# Patient Record
Sex: FEMALE | Race: WHITE | Employment: UNEMPLOYED | URBAN - METROPOLITAN AREA
[De-identification: names, ages, dates, MRNs, and addresses within clinical notes are randomized per-mention and may not be internally consistent; named-entity substitution may affect disease eponyms.]

---

## 2019-01-28 ENCOUNTER — OFFICE VISIT (OUTPATIENT)
Dept: CARDIOLOGY CLINIC | Age: 64
End: 2019-01-28
Payer: COMMERCIAL

## 2019-01-28 ENCOUNTER — TELEPHONE (OUTPATIENT)
Dept: CARDIOLOGY CLINIC | Age: 64
End: 2019-01-28

## 2019-01-28 VITALS
WEIGHT: 153.4 LBS | SYSTOLIC BLOOD PRESSURE: 122 MMHG | HEIGHT: 67 IN | HEART RATE: 84 BPM | DIASTOLIC BLOOD PRESSURE: 70 MMHG | BODY MASS INDEX: 24.08 KG/M2 | OXYGEN SATURATION: 96 %

## 2019-01-28 DIAGNOSIS — R07.89 OTHER CHEST PAIN: ICD-10-CM

## 2019-01-28 DIAGNOSIS — R94.31 ABNORMAL EKG: ICD-10-CM

## 2019-01-28 PROCEDURE — 99204 OFFICE O/P NEW MOD 45 MIN: CPT | Performed by: INTERNAL MEDICINE

## 2019-01-28 RX ORDER — ATORVASTATIN CALCIUM 10 MG/1
10 TABLET, FILM COATED ORAL DAILY
COMMUNITY
End: 2019-01-30 | Stop reason: ALTCHOICE

## 2019-01-30 RX ORDER — LOSARTAN POTASSIUM AND HYDROCHLOROTHIAZIDE 12.5; 1 MG/1; MG/1
1 TABLET ORAL DAILY
Qty: 90 TABLET | Refills: 1 | COMMUNITY
End: 2022-07-29 | Stop reason: DRUGHIGH

## 2019-01-30 RX ORDER — ESCITALOPRAM OXALATE 20 MG/1
20 TABLET ORAL DAILY
Qty: 30 TABLET | Refills: 3 | COMMUNITY
End: 2019-02-11

## 2019-01-30 RX ORDER — ATORVASTATIN CALCIUM 20 MG/1
20 TABLET, FILM COATED ORAL DAILY
Qty: 90 TABLET | Refills: 1 | COMMUNITY
End: 2020-07-21 | Stop reason: SDUPTHER

## 2019-02-11 ENCOUNTER — HOSPITAL ENCOUNTER (OUTPATIENT)
Dept: NON INVASIVE DIAGNOSTICS | Age: 64
Discharge: HOME | End: 2019-02-11
Payer: COMMERCIAL

## 2019-02-11 ENCOUNTER — HOSPITAL ENCOUNTER (OUTPATIENT)
Dept: NUCLEAR MEDICINE | Age: 64
Discharge: HOME | End: 2019-02-11
Payer: COMMERCIAL

## 2019-02-11 DIAGNOSIS — R94.31 ABNORMAL EKG: ICD-10-CM

## 2019-02-11 DIAGNOSIS — R07.89 OTHER CHEST PAIN: ICD-10-CM

## 2019-02-11 LAB
LV EF: 58 %
LV EF: 60 %
LVEF MODALITY: NORMAL
LVEF MODALITY: NORMAL

## 2019-02-11 PROCEDURE — 93306 TTE W/DOPPLER COMPLETE: CPT

## 2019-02-11 PROCEDURE — 3430000000 HC RX DIAGNOSTIC RADIOPHARMACEUTICAL: Performed by: INTERNAL MEDICINE

## 2019-02-11 PROCEDURE — 93017 CV STRESS TEST TRACING ONLY: CPT

## 2019-02-11 PROCEDURE — 78452 HT MUSCLE IMAGE SPECT MULT: CPT

## 2019-02-11 PROCEDURE — A9502 TC99M TETROFOSMIN: HCPCS | Performed by: INTERNAL MEDICINE

## 2019-02-11 RX ADMIN — TETROFOSMIN 32.1 MILLICURIE: 0.23 INJECTION, POWDER, LYOPHILIZED, FOR SOLUTION INTRAVENOUS at 08:30

## 2019-02-11 RX ADMIN — TETROFOSMIN 11.4 MILLICURIE: 0.23 INJECTION, POWDER, LYOPHILIZED, FOR SOLUTION INTRAVENOUS at 07:30

## 2019-02-12 ENCOUNTER — TELEPHONE (OUTPATIENT)
Dept: CARDIOLOGY CLINIC | Age: 64
End: 2019-02-12

## 2019-02-12 RX ORDER — NITROGLYCERIN 0.4 MG/1
TABLET SUBLINGUAL
Qty: 25 TABLET | Refills: 3 | Status: SHIPPED | OUTPATIENT
Start: 2019-02-12

## 2019-02-15 ENCOUNTER — TELEPHONE (OUTPATIENT)
Dept: CARDIOLOGY CLINIC | Age: 64
End: 2019-02-15

## 2019-03-04 ENCOUNTER — TELEPHONE (OUTPATIENT)
Dept: CARDIOLOGY CLINIC | Age: 64
End: 2019-03-04

## 2019-03-21 ENCOUNTER — TELEPHONE (OUTPATIENT)
Dept: CARDIOLOGY CLINIC | Age: 64
End: 2019-03-21

## 2019-03-25 ENCOUNTER — HOSPITAL ENCOUNTER (OUTPATIENT)
Dept: CARDIAC CATH/INVASIVE PROCEDURES | Age: 64
Setting detail: OUTPATIENT SURGERY
Discharge: HOME | End: 2019-03-25
Attending: INTERNAL MEDICINE | Admitting: INTERNAL MEDICINE
Payer: COMMERCIAL

## 2019-03-25 VITALS — BODY MASS INDEX: 24.01 KG/M2 | WEIGHT: 153 LBS | HEIGHT: 67 IN

## 2019-03-25 LAB
ANION GAP SERPL CALCULATED.3IONS-SCNC: 12 MMOL/L (ref 3–16)
BUN BLDV-MCNC: 18 MG/DL (ref 7–20)
CALCIUM SERPL-MCNC: 9.4 MG/DL (ref 8.3–10.6)
CHLORIDE BLD-SCNC: 99 MMOL/L (ref 99–110)
CO2: 29 MMOL/L (ref 21–32)
CREAT SERPL-MCNC: 0.8 MG/DL (ref 0.6–1.2)
EKG ATRIAL RATE: 79 BPM
EKG ATRIAL RATE: 90 BPM
EKG DIAGNOSIS: NORMAL
EKG DIAGNOSIS: NORMAL
EKG P AXIS: 65 DEGREES
EKG P AXIS: 69 DEGREES
EKG P-R INTERVAL: 164 MS
EKG P-R INTERVAL: 164 MS
EKG Q-T INTERVAL: 412 MS
EKG Q-T INTERVAL: 418 MS
EKG QRS DURATION: 84 MS
EKG QRS DURATION: 86 MS
EKG QTC CALCULATION (BAZETT): 479 MS
EKG QTC CALCULATION (BAZETT): 504 MS
EKG R AXIS: 53 DEGREES
EKG R AXIS: 60 DEGREES
EKG T AXIS: 23 DEGREES
EKG T AXIS: 31 DEGREES
EKG VENTRICULAR RATE: 79 BPM
EKG VENTRICULAR RATE: 90 BPM
GFR AFRICAN AMERICAN: >60
GFR NON-AFRICAN AMERICAN: >60
GLUCOSE BLD-MCNC: 103 MG/DL (ref 70–99)
HCT VFR BLD CALC: 39.2 % (ref 36–48)
HEMOGLOBIN: 13.6 G/DL (ref 12–16)
INR BLD: 1.04 (ref 0.86–1.14)
LV EF: 70 %
LVEF MODALITY: NORMAL
MCH RBC QN AUTO: 31 PG (ref 26–34)
MCHC RBC AUTO-ENTMCNC: 34.7 G/DL (ref 31–36)
MCV RBC AUTO: 89.3 FL (ref 80–100)
PDW BLD-RTO: 13.2 % (ref 12.4–15.4)
PLATELET # BLD: 189 K/UL (ref 135–450)
PMV BLD AUTO: 7.8 FL (ref 5–10.5)
POTASSIUM SERPL-SCNC: 3.6 MMOL/L (ref 3.5–5.1)
PROTHROMBIN TIME: 11.9 SEC (ref 9.8–13)
RBC # BLD: 4.39 M/UL (ref 4–5.2)
SODIUM BLD-SCNC: 140 MMOL/L (ref 136–145)
WBC # BLD: 5.8 K/UL (ref 4–11)

## 2019-03-25 PROCEDURE — 85027 COMPLETE CBC AUTOMATED: CPT

## 2019-03-25 PROCEDURE — 93005 ELECTROCARDIOGRAM TRACING: CPT | Performed by: INTERNAL MEDICINE

## 2019-03-25 PROCEDURE — 85610 PROTHROMBIN TIME: CPT

## 2019-03-25 PROCEDURE — 93452 LEFT HRT CATH W/VENTRCLGRPHY: CPT | Performed by: INTERNAL MEDICINE

## 2019-03-25 PROCEDURE — 99152 MOD SED SAME PHYS/QHP 5/>YRS: CPT

## 2019-03-25 PROCEDURE — 2500000003 HC RX 250 WO HCPCS

## 2019-03-25 PROCEDURE — 85347 COAGULATION TIME ACTIVATED: CPT

## 2019-03-25 PROCEDURE — C1887 CATHETER, GUIDING: HCPCS

## 2019-03-25 PROCEDURE — 80048 BASIC METABOLIC PNL TOTAL CA: CPT

## 2019-03-25 PROCEDURE — 2580000003 HC RX 258

## 2019-03-25 PROCEDURE — C1894 INTRO/SHEATH, NON-LASER: HCPCS

## 2019-03-25 PROCEDURE — 6370000000 HC RX 637 (ALT 250 FOR IP)

## 2019-03-25 PROCEDURE — 6360000002 HC RX W HCPCS

## 2019-03-25 PROCEDURE — C1769 GUIDE WIRE: HCPCS

## 2019-03-25 PROCEDURE — 6370000000 HC RX 637 (ALT 250 FOR IP): Performed by: INTERNAL MEDICINE

## 2019-03-25 PROCEDURE — 2709999900 HC NON-CHARGEABLE SUPPLY

## 2019-03-25 PROCEDURE — 93458 L HRT ARTERY/VENTRICLE ANGIO: CPT

## 2019-03-25 PROCEDURE — 99153 MOD SED SAME PHYS/QHP EA: CPT

## 2019-03-25 RX ORDER — MIDAZOLAM HYDROCHLORIDE 5 MG/ML
INJECTION INTRAMUSCULAR; INTRAVENOUS
Status: COMPLETED | OUTPATIENT
Start: 2019-03-25 | End: 2019-03-25

## 2019-03-25 RX ORDER — CLOPIDOGREL 300 MG/1
TABLET, FILM COATED ORAL
Status: COMPLETED | OUTPATIENT
Start: 2019-03-25 | End: 2019-03-25

## 2019-03-25 RX ORDER — SODIUM CHLORIDE 0.9 % (FLUSH) 0.9 %
10 SYRINGE (ML) INJECTION EVERY 12 HOURS SCHEDULED
Status: DISCONTINUED | OUTPATIENT
Start: 2019-03-25 | End: 2019-03-25 | Stop reason: HOSPADM

## 2019-03-25 RX ORDER — HEPARIN SODIUM 1000 [USP'U]/ML
INJECTION, SOLUTION INTRAVENOUS; SUBCUTANEOUS
Status: COMPLETED | OUTPATIENT
Start: 2019-03-25 | End: 2019-03-25

## 2019-03-25 RX ORDER — SODIUM CHLORIDE 0.9 % (FLUSH) 0.9 %
10 SYRINGE (ML) INJECTION PRN
Status: DISCONTINUED | OUTPATIENT
Start: 2019-03-25 | End: 2019-03-25 | Stop reason: HOSPADM

## 2019-03-25 RX ORDER — ASPIRIN 81 MG/1
243 TABLET, CHEWABLE ORAL ONCE
Status: DISCONTINUED | OUTPATIENT
Start: 2019-03-25 | End: 2019-03-25

## 2019-03-25 RX ORDER — SODIUM CHLORIDE 9 MG/ML
1000 INJECTION, SOLUTION INTRAVENOUS CONTINUOUS
Status: DISCONTINUED | OUTPATIENT
Start: 2019-03-25 | End: 2019-03-25 | Stop reason: HOSPADM

## 2019-03-25 RX ORDER — ASPIRIN 81 MG/1
81 TABLET, CHEWABLE ORAL ONCE
Status: COMPLETED | OUTPATIENT
Start: 2019-03-25 | End: 2019-03-25

## 2019-03-25 RX ORDER — CLOPIDOGREL BISULFATE 75 MG/1
75 TABLET ORAL DAILY
Qty: 30 TABLET | Refills: 0 | Status: SHIPPED | OUTPATIENT
Start: 2019-03-25 | End: 2019-04-17 | Stop reason: SINTOL

## 2019-03-25 RX ORDER — ATENOLOL 25 MG/1
25 TABLET ORAL DAILY
Qty: 30 TABLET | Refills: 3 | Status: SHIPPED | OUTPATIENT
Start: 2019-03-25 | End: 2019-05-23 | Stop reason: SDUPTHER

## 2019-03-25 RX ORDER — FENTANYL CITRATE 50 UG/ML
INJECTION, SOLUTION INTRAMUSCULAR; INTRAVENOUS
Status: COMPLETED | OUTPATIENT
Start: 2019-03-25 | End: 2019-03-25

## 2019-03-25 RX ORDER — ACETAMINOPHEN 325 MG/1
650 TABLET ORAL EVERY 4 HOURS PRN
Status: DISCONTINUED | OUTPATIENT
Start: 2019-03-25 | End: 2019-03-25 | Stop reason: HOSPADM

## 2019-03-25 RX ADMIN — ASPIRIN 81 MG: 81 TABLET, CHEWABLE ORAL at 07:35

## 2019-03-25 RX ADMIN — HEPARIN SODIUM 5000 UNITS: 1000 INJECTION, SOLUTION INTRAVENOUS; SUBCUTANEOUS at 09:42

## 2019-03-25 RX ADMIN — SODIUM CHLORIDE 1000 ML: 9 INJECTION, SOLUTION INTRAVENOUS at 07:35

## 2019-03-25 RX ADMIN — FENTANYL CITRATE 25 MCG: 50 INJECTION, SOLUTION INTRAMUSCULAR; INTRAVENOUS at 09:30

## 2019-03-25 RX ADMIN — CLOPIDOGREL 600 MG: 300 TABLET, FILM COATED ORAL at 09:48

## 2019-03-25 RX ADMIN — MIDAZOLAM HYDROCHLORIDE 2 MG: 5 INJECTION INTRAMUSCULAR; INTRAVENOUS at 09:31

## 2019-03-26 LAB — POC ACT LR: 381 SEC

## 2019-04-03 ENCOUNTER — TELEPHONE (OUTPATIENT)
Dept: CARDIOLOGY CLINIC | Age: 64
End: 2019-04-03

## 2019-04-03 NOTE — TELEPHONE ENCOUNTER
3/25/19 Dr. Stanley Wilkinson, mainly in LCx and RCA, will treat medially  Patient was intolerant of metoprolol, will add atenolol in its place and add plavix  Ulcerated plaque in RCA, treat with dapt, aspirin and plavix

## 2019-04-03 NOTE — TELEPHONE ENCOUNTER
Lets have her stop plavix, and lets hold off on alternate med until she feels better. Let her know it is not an absolute must that she take plavix but she should take aspirin. Lets put in a med intolerance note in epic allergy section for plavix causing nausea for her. Thanks.

## 2019-04-03 NOTE — TELEPHONE ENCOUNTER
Spoke to pt. She is going to stop the Plavix and continue the [place]. Pt will let us know how she is feeling. I will update allergy in pt's chart. Pt v/u.

## 2019-04-03 NOTE — TELEPHONE ENCOUNTER
Pt called and stated that she is having a hard time taking plavix. She stated that it is resally bothering her stomach causing nausea. Pt would like to know If there is anything else she can take.  Please advise

## 2019-04-17 ENCOUNTER — OFFICE VISIT (OUTPATIENT)
Dept: CARDIOLOGY CLINIC | Age: 64
End: 2019-04-17
Payer: COMMERCIAL

## 2019-04-17 VITALS
DIASTOLIC BLOOD PRESSURE: 68 MMHG | BODY MASS INDEX: 24.01 KG/M2 | OXYGEN SATURATION: 95 % | HEART RATE: 72 BPM | WEIGHT: 153 LBS | HEIGHT: 67 IN | SYSTOLIC BLOOD PRESSURE: 110 MMHG

## 2019-04-17 DIAGNOSIS — I10 ESSENTIAL HYPERTENSION: ICD-10-CM

## 2019-04-17 DIAGNOSIS — I25.10 CORONARY ARTERY DISEASE INVOLVING NATIVE CORONARY ARTERY OF NATIVE HEART WITHOUT ANGINA PECTORIS: Primary | ICD-10-CM

## 2019-04-17 DIAGNOSIS — E78.2 MIXED HYPERLIPIDEMIA: ICD-10-CM

## 2019-04-17 PROCEDURE — 99214 OFFICE O/P EST MOD 30 MIN: CPT | Performed by: NURSE PRACTITIONER

## 2019-04-17 RX ORDER — ESCITALOPRAM OXALATE 20 MG/1
20 TABLET ORAL DAILY
COMMUNITY

## 2019-04-17 ASSESSMENT — ENCOUNTER SYMPTOMS
RESPIRATORY NEGATIVE: 1
ABDOMINAL PAIN: 1
NAUSEA: 1

## 2019-04-17 NOTE — PROGRESS NOTES
[address]   Cardiology Note              Date:  April 17, 2019  Patientname: Esmer Pisano  YOB: 1955    Primary Care physician: Olman Bear MD    HISTORY OF PRESENT ILLNESS: Esmer Pisano is a 66 y.o. female was referred to cardiology for an abnormal EKG (ST depression). Stress test 2/11/2019 was abnormal due to ST depression on EKG, normal perfusion. Echo showed EF 55-60%. Coronary angiography on 3/25/19 showed moderate CAD, medical management. Today she presents for hospital follow up for CAD post angiogram. Overall she feels well. Denies chest pain, shortness of breath, palpitations and dizziness. She has had GI upset and heartburn with pills. She has stopped plavix due to this. She takes her pills after she has had dinner at night. No bleeding problems. She is active at home and has no discomfort during exertion. Past Medical History:   has a past medical history of Chest pressure, Depression, High cholesterol, Hypertension, and RA (rheumatoid arthritis) ([facility] [address] Past Surgical History:   has a past surgical history that includes Tubal ligation; Colonoscopy; other surgical history (10/21/2015); and Cardiac catheterization (03/25/2019). Home Medications:    Prior to Admission medications    Medication Sig Start Date End Date Taking? Authorizing Provider   clopidogrel (PLAVIX) 75 MG tablet Take 1 tablet by mouth daily 3/25/19   Kendall Romero MD   atenolol (TENORMIN) 25 MG tablet Take 1 tablet by mouth daily 3/25/19   Kendall Romero MD   nitroGLYCERIN (NITROSTAT) 0.4 MG SL tablet up to max of 3 total doses.  If no relief after 1 dose, call 911. 2/12/19   Kendall Romero MD   losartan-hydrochlorothiazide [facility]) 50-12.5 MG per tablet Take 1 tablet by mouth daily    Kendall Romero MD   atorvastatin (LIPITOR) 20 MG tablet Take 1 tablet by mouth daily    Kendall Romero MD   ENBREL SURECLICK 50 MG/ML SOAJ  47/9/44   Historical Provider, MD   aspirin 81 MG chewable tablet Take 81 steady ekg changes (other than w/ contrast), and no cp during case. Cad/ashd, mainly in LCx and RCA, will treat medially  Patient was intolerant of metoprolol, will add atenolol in its place and add plavix  Ulcerated plaque in RCA, treat with dapt, aspirin and plavix     Cardiology Labs Reviewed:   CBC: No results for input(s): WBC, HGB, HCT, PLT in the last 72 hours. BMP:No results for input(s): NA, K, CO2, BUN, CREATININE, LABGLOM, GLUCOSE in the last 72 hours. PT/INR: No results for input(s): PROTIME, INR in the last 72 hours. APTT:No results for input(s): APTT in the last 72 hours. FASTING LIPID PANEL:No results found for: HDL, LDLDIRECT, LDLCALC, TRIG  LIVER PROFILE:No results for input(s): AST, ALT, ALB in the last 72 hours. BNP: No results found for: PROBNP    Reviewed all labs and imaging today    Assessment:   CAD: stable, moderate in LCx/RCA on angiogram, medical management 3/25/2019  HTN: controlled  HLD: uncontrolled,  1/2019, statin increased  GI upset  Tobacco abuse: counseled    Plan:   1. Repeat lipids/LFTs in 3-6 months; if LDL not <70, increase statin  2. Continue aspirin, atenolol, lipitor, losartan-hctz (intolerant to metoprolol and plavix due to GI symptoms)   - Try to adjust the times you take pills to minimize GI effects  3. Continue diet and exercise  4. Follow up with PCP regarding GI symptoms  5.  Follow up in 6 months    DRU Contreras-CNP  [facility]  [phone]

## 2019-04-17 NOTE — PATIENT INSTRUCTIONS
ventricular hypertrophy. Normal diastolic function with normal filling pressure. Aortic valve appears mildly sclerotic but opens adequately. Inadequate tricuspid regurgitation to estimate systolic pulmonary artery   pressure. Coronary angiogram 3/25/2019:  Class 3 angina  Low to intermediate risk abnl stress test  Left heart catheterization  LVgram  Coronary angiogam  Coronary cath  PROCEDURE DESCRIPTION   Risks/benefits/alternatives/outcomes were discussed with patient and/or family and informed consent was obtained. Using the [facility] test, the patient's right radial artery was found to be acceptable for cannulation. Patient was prepped draped in the usual sterile fashion. Local anaesthetic was applied over puncture site. Using a back wall technique, a 6 [place]ien Terumo sheath was inserted into right radial artery. Verapamil, nitroglycerin were administered through the sheath. Heparin was administered. Diagnostic 5fr pigtail, TIG catheters were used for diagnostic angiograms. At the conclusion of the procedure, a TR band was placed over the puncture site and hemostasis was obtained. There were no immediate complications. I supervised sedation with versed 2mg/fentanyl 25mcg during the procedure. 70cc contrast was utilized. <20cc EBL. LVEDP 13   GRADIENT ACROSS AORTIC VALVE No gradient   LV FUNCTION EF 65-75%   WALL MOTION Hyperdynamic   MITRAL REGURGITATION Mild     LM <10% stenosis.       LAD Prox 30% stenosis. Mid 30-40% stenosis. Distal 10% stenosis. LAD wraps around apex. D1 is large vessel with ostial 30-40% stenosis.       LCX Small vessel. OM1 is small vessel. OM2 is largest OM and has prox-mid 75% stenosis.             RCA Dominant. Prox vessel ulcerated plaque noted with prox mid 30-40% stenosis. Mid-distal 50% stenosis. Ulcerated plaque was followed on serial angio in cath lab due to concern may have been raised by catheter.   There was no limitation in flow, staining, steady ekg changes (other than w/ contrast), and no cp during case. Cad/ashd, mainly in LCx and RCA, will treat medially  Patient was intolerant of metoprolol, will add atenolol in its place and add plavix  Ulcerated plaque in RCA, treat with dapt, aspirin and plavix     Cardiology Labs Reviewed:   CBC: No results for input(s): WBC, HGB, HCT, PLT in the last 72 hours. BMP:No results for input(s): NA, K, CO2, BUN, CREATININE, LABGLOM, GLUCOSE in the last 72 hours. PT/INR: No results for input(s): PROTIME, INR in the last 72 hours. APTT:No results for input(s): APTT in the last 72 hours. FASTING LIPID PANEL:No results found for: HDL, LDLDIRECT, LDLCALC, TRIG  LIVER PROFILE:No results for input(s): AST, ALT, ALB in the last 72 hours. BNP: No results found for: PROBNP    Reviewed all labs and imaging today    Assessment:   CAD: stable, moderate in LCx/RCA on angiogram, medical management 3/25/2019  HTN: controlled  HLD: uncontrolled,  1/2019, statin increased  GI upset  Tobacco abuse: counseled    Plan:   1. Repeat lipids/LFTs in 3-6 months; if LDL not <70, increase statin  2. Continue aspirin, atenolol, lipitor, losartan-hctz (intolerant to metoprolol and plavix due to GI symptoms)   - Try to adjust the times you take pills to minimize GI effects  3. Continue diet and exercise  4. Follow up with PCP regarding GI symptoms  5.  Follow up in 6 months    DRU Rosales-CNP  [facility]  [phone]

## 2019-05-23 ENCOUNTER — TELEPHONE (OUTPATIENT)
Dept: CARDIOLOGY CLINIC | Age: 64
End: 2019-05-23

## 2019-05-23 RX ORDER — ATENOLOL 25 MG/1
25 TABLET ORAL DAILY
Qty: 90 TABLET | Refills: 3 | Status: SHIPPED | OUTPATIENT
Start: 2019-05-23 | End: 2020-05-18

## 2020-05-17 ENCOUNTER — TELEPHONE (OUTPATIENT)
Dept: CARDIOLOGY CLINIC | Age: 65
End: 2020-05-17

## 2020-05-18 RX ORDER — ATENOLOL 25 MG/1
TABLET ORAL
Qty: 90 TABLET | Refills: 0 | Status: SHIPPED | OUTPATIENT
Start: 2020-05-18 | End: 2020-08-17

## 2020-06-22 ENCOUNTER — OFFICE VISIT (OUTPATIENT)
Dept: CARDIOLOGY CLINIC | Age: 65
End: 2020-06-22
Payer: COMMERCIAL

## 2020-06-22 VITALS
BODY MASS INDEX: 24.03 KG/M2 | DIASTOLIC BLOOD PRESSURE: 60 MMHG | SYSTOLIC BLOOD PRESSURE: 108 MMHG | HEIGHT: 66 IN | WEIGHT: 149.5 LBS

## 2020-06-22 PROCEDURE — 99213 OFFICE O/P EST LOW 20 MIN: CPT | Performed by: NURSE PRACTITIONER

## 2020-06-22 ASSESSMENT — ENCOUNTER SYMPTOMS: RESPIRATORY NEGATIVE: 1

## 2020-06-22 NOTE — PROGRESS NOTES
artery   pressure. Cardiology Labs Reviewed:   CBC: No results for input(s): WBC, HGB, HCT, PLT in the last 72 hours. BMP:No results for input(s): NA, K, CO2, BUN, CREATININE, LABGLOM, GLUCOSE in the last 72 hours. PT/INR: No results for input(s): PROTIME, INR in the last 72 hours. APTT:No results for input(s): APTT in the last 72 hours. FASTING LIPID PANEL:No results found for: HDL, LDLDIRECT, LDLCALC, TRIG  LIVER PROFILE:No results for input(s): AST, ALT, ALB in the last 72 hours. BNP: No results found for: PROBNP    Reviewed all labs and imaging today    Assessment:   Dizziness: chronic, etiology unclear  CAD: stable, no angina; moderate in LCx/RCA on angiogram 3/25/2019  HTN: controlled  HLD: uncontrolled,  in 1/2019, continue statin and recheck  Tobacco abuse: counseled    Plan:   1. Update yearly labs- CBC, BMP, LFTs, lipids  2. Check BP at home and call the office if outside goal range  3. Continue aspirin, atenolol, lipitor, losartan-hctz (intolerant to metoprolol and plavix due to GI symptoms)  4. Stay active along with a healthy diet  5.  Follow up in 1 year with Dr. Olga James, APRN-CNP  [facility]  [phone]

## 2020-06-22 NOTE — LETTER
citalopram (CELEXA) 20 MG tablet Take 20 mg by mouth daily. Historical Provider, MD     Allergies:  Plavix [clopidogrel]    Social History:   reports that she has been smoking cigarettes. She has a 25.00 pack-year smoking history. She has never used smokeless tobacco. She reports current alcohol use. She reports that she does not use drugs. Family History: family history includes Heart Attack in her maternal uncle; Stroke in her maternal grandmother. Review of Systems   Review of Systems   Constitutional: Negative. Respiratory: Negative. Cardiovascular: Negative. Neurological: Positive for dizziness. OBJECTIVE:    Vital signs:    /60   Ht 5' 6\" (1.676 m)   Wt 149 lb 8 oz (67.8 kg)   BMI 24.13 kg/m²       Physical Exam:  Constitutional:  Comfortable and alert, NAD, appears stated age  Eyes: PERRL, sclera nonicteric  Neck:  Supple, no masses, no thyroidmegaly, no JVD  Skin:  Warm and dry; no rash or lesions  Heart: Regular, normal apex, S1 and S2 normal, no M/G/R  Lungs:  Normal respiratory effort; clear; no wheezing/rhonchi/rales  Abdomen: soft, non tender, + bowel sounds  Extremities:  No edema or cyanosis; no clubbing  Neuro: alert and oriented, moves legs and arms equally, normal mood and affect    Data Reviewed:      Coronary angiogram 3/25/2019:  Class 3 angina  Low to intermediate risk abnl stress test  Left heart catheterization  LVgram  Coronary angiogam  Coronary cath  PROCEDURE DESCRIPTION   Risks/benefits/alternatives/outcomes were discussed with patient and/or family and informed consent was obtained. Using the [facility] test, the patient's right radial artery was found to be acceptable for cannulation. Patient was prepped draped in the usual sterile fashion. Local anaesthetic was applied over puncture site. Using a back wall technique, a 6 [place] Terumo sheath was inserted into right radial artery. Verapamil, nitroglycerin were administered through the sheath.   Heparin

## 2020-07-21 LAB
A/G RATIO: NORMAL
ALBUMIN SERPL-MCNC: 4 G/DL
ALP BLD-CCNC: 84 U/L
ALT SERPL-CCNC: 13 U/L
AST SERPL-CCNC: 20 U/L
BILIRUB SERPL-MCNC: 0.6 MG/DL (ref 0.1–1.4)
BILIRUBIN DIRECT: 0.12 MG/DL
BILIRUBIN, INDIRECT: NORMAL
BUN BLDV-MCNC: 12 MG/DL
CALCIUM SERPL-MCNC: 9.2 MG/DL
CHLORIDE BLD-SCNC: 99 MMOL/L
CHOLESTEROL, TOTAL: 183 MG/DL
CHOLESTEROL/HDL RATIO: NORMAL
CO2: 25 MMOL/L
CREAT SERPL-MCNC: 0.93 MG/DL
GFR CALCULATED: 66
GLOBULIN: NORMAL
GLUCOSE BLD-MCNC: 93 MG/DL
HCT VFR BLD CALC: 40.8 % (ref 36–46)
HDLC SERPL-MCNC: 40 MG/DL (ref 35–70)
HEMOGLOBIN: 13.9 G/DL (ref 12–16)
LDL CHOLESTEROL CALCULATED: 106 MG/DL (ref 0–160)
PLATELET # BLD: 200 K/ΜL
POTASSIUM SERPL-SCNC: 4 MMOL/L
PROTEIN TOTAL: 7.4 G/DL
SODIUM BLD-SCNC: 138 MMOL/L
TRIGL SERPL-MCNC: 183 MG/DL
VLDLC SERPL CALC-MCNC: 106 MG/DL
WBC # BLD: 6.1 10^3/ML

## 2020-07-21 RX ORDER — ATORVASTATIN CALCIUM 40 MG/1
40 TABLET, FILM COATED ORAL DAILY
Qty: 90 TABLET | Refills: 3 | Status: CANCELLED | OUTPATIENT
Start: 2020-07-21

## 2020-07-21 NOTE — TELEPHONE ENCOUNTER
----- Message from DRU Carter - CNP sent at 7/21/2020 11:31 AM EDT -----  Please let her know that blood work mostly stable however LDL is not at goal. Recommend increasing lipitor to 40 mg daily and recheck lipids/LFTs in 2 months. Thanks!

## 2020-07-22 RX ORDER — ATORVASTATIN CALCIUM 40 MG/1
40 TABLET, FILM COATED ORAL DAILY
Qty: 90 TABLET | Refills: 3 | Status: SHIPPED | OUTPATIENT
Start: 2020-07-22 | End: 2021-06-01

## 2020-08-17 RX ORDER — ATENOLOL 25 MG/1
TABLET ORAL
Qty: 90 TABLET | Refills: 3 | Status: SHIPPED | OUTPATIENT
Start: 2020-08-17 | End: 2021-08-11

## 2021-06-01 RX ORDER — ATORVASTATIN CALCIUM 40 MG/1
TABLET, FILM COATED ORAL
Qty: 90 TABLET | Refills: 0 | Status: SHIPPED | OUTPATIENT
Start: 2021-06-01 | End: 2021-08-30

## 2021-08-11 RX ORDER — ATENOLOL 25 MG/1
TABLET ORAL
Qty: 90 TABLET | Refills: 3 | Status: SHIPPED | OUTPATIENT
Start: 2021-08-11 | End: 2022-07-29 | Stop reason: SINTOL

## 2021-08-11 NOTE — TELEPHONE ENCOUNTER
Pt/pharmacy requesting atenolol 25 mg tab. [facility]. Last OV 6/22/2021    Per last OV note: Continue aspirin, atenolol, lipitor, losartan-hctz (intolerant to metoprolol and plavix due to GI symptoms)  Follow up in 1 year with Dr. Philip Mujica    No upcoming [address] scheduled at this time.

## 2021-08-31 RX ORDER — ATORVASTATIN CALCIUM 40 MG/1
TABLET, FILM COATED ORAL
Qty: 90 TABLET | Refills: 0 | Status: SHIPPED | OUTPATIENT
Start: 2021-08-31 | End: 2021-12-03

## 2021-10-02 PROBLEM — I25.10 CORONARY ARTERY DISEASE INVOLVING NATIVE CORONARY ARTERY OF NATIVE HEART WITHOUT ANGINA PECTORIS: Status: ACTIVE | Noted: 2021-10-02

## 2021-10-03 NOTE — PROGRESS NOTES
[facility]   CARDIAC EVALUATION NOTE  [phone]      PCP:  Alejandra Ramos MD    Reason for Consultation/Chief Complaint: follow up for moderate CAD    Subjective   History of Present Illness:  Cristela Louis is a 61 y.o. patient with a history of moderate CAD who presents referred by Dr. Ashley Osorio for abnormal EKG.  1/20/19 EKG showed SR, non specific ST depression, 73 bpm.  [facility] dr Shelley Ramirez in past.   She states she has had chest pressure x 6 months. Happens daily. She can be sitting or be exerting when it happens. Middle of chest, pressure. She states this is new over the last 6 months. Stress test 2/11/2019 was abnormal due to ST depression on EKG, normal perfusion. Echo 02/11/19 showed EF 55-60%. Coronary angiography on 3/25/19 showed moderate CAD, medical management. Today she reports she feels well overall. She has occasional mild CP but this is not bothersome. She denies palpitations, dizziness, SOB or syncope. Past Medical History:   has a past medical history of Chest pressure, Depression, High cholesterol, Hypertension, and RA (rheumatoid arthritis) ([address] Surgical History:   has a past surgical history that includes Tubal ligation; Colonoscopy; other surgical history (10/21/2015); Cardiac catheterization (03/25/2019); and Diagnostic Cardiac Cath Lab Procedure. Social History: retired payroll. reports that she has been smoking Cigarettes. She has been smoking about 1.00 pack per day. She does not have any smokeless tobacco history on file. She reports that she drinks alcohol. She reports that she does not use drugs. Family History:  Mom, dad with no heart disease.  Both passed of CA  No sibling with heart disease    Home Medications:  Were reviewed and are listed in nursing record and/or below  Prior to Admission medications    Medication Sig Start Date End Date Taking? Authorizing Provider   atorvastatin (LIPITOR) 40 MG tablet TAKE 1 TABLET DAILY 8/31/21  Yes DRU Person CNP   atenolol (TENORMIN) 25 MG tablet TAKE 1 TABLET DAILY 8/11/21  Yes DRU Preson CNP   escitalopram (LEXAPRO) 20 MG tablet Take 20 mg by mouth daily   Yes Historical Provider, MD   nitroGLYCERIN (NITROSTAT) 0.4 MG SL tablet up to max of 3 total doses. If no relief after 1 dose, call 911. 2/12/19  Yes Fozia Blood MD   losartan-hydrochlorothiazide [facility]) 100-12.5 MG per tablet Take 1 tablet by mouth daily    Yes Fozia Blood MD   ENBREL SURECLICK 50 MG/ML SOAJ  13/5/66  Yes Historical Provider, MD   aspirin 81 MG chewable tablet Take 81 mg by mouth daily    Yes Historical Provider, MD   citalopram (CELEXA) 20 MG tablet Take 20 mg by mouth daily. Yes Historical Provider, MD          Allergies:  Plavix [clopidogrel] and Methotrexate     Review of Systems:   A 14 point review of symptoms completed. Pertinent positives identified in the HPI, all other review of symptoms negative as below.       Objective   PHYSICAL EXAM:    Vitals:    10/06/21 1256   BP: 132/72   Pulse: 74   SpO2: 94%    Weight: 140 lb (63.5 kg)         General Appearance:  Alert, cooperative, no distress, appears stated age   Head:  Normocephalic, without obvious abnormality, atraumatic   Eyes:  PERRL, conjunctiva/corneas clear   Nose: Nares normal, no drainage or sinus tenderness   Throat: Lips, mucosa, and tongue normal   Neck: Supple, symmetrical, trachea midline, no adenopathy, thyroid: not enlarged, symmetric, no tenderness/mass/nodules, no carotid bruit or JVD   Lungs:   Clear to auscultation bilaterally, respirations unlabored   Chest Wall:  No deformity or tenderness   Heart:  Regular rate and rhythm, S1, S2 normal, no murmur, rub or gallop   Abdomen:   Soft, non-tender, bowel sounds active all four quadrants,  no masses, no organomegaly   Extremities: Extremities normal, atraumatic, no cyanosis or edema   Pulses: 2+ and symmetric   Skin: Skin color, texture, turgor normal, no rashes or lesions   Pysch: Normal mood and affect   Neurologic: Normal gross motor and sensory exam.         Labs   CBC:   Lab Results   Component Value Date    WBC 6.1 2020    RBC 4.39 2019    HGB 13.9 2020    HCT 40.8 2020    MCV 89.3 2019    RDW 13.2 2019     2020     CMP:  Lab Results   Component Value Date     2020    K 4.0 2020    CL 99 2020    CO2 25 2020    BUN 12 2020    CREATININE 0.93 2020    GFRAA >60 2019    AGRATIO 1.2 2015    LABGLOM 66 2020    LABGLOM >60 2019    GLUCOSE 93 2020    PROT 7.4 2020    CALCIUM 9.2 2020    BILITOT 0.6 2020    ALKPHOS 84 2020    AST 20 2020    ALT 13 2020         Cardiac Data     Last EK19 EKG showed SR, non specific ST depression, 73 bpm.     Echo: 19   Summary   LV systolic function is normal with EF estimated at 55-60 %. No regional wall motion abnormalities are noted. There is mild concentric left ventricular hypertrophy. Normal diastolic function with normal filling pressure. Aortic valve appears mildly sclerotic but opens adequately. Inadequate tricuspid regurgitation to estimate systolic pulmonary artery   pressure. Stress Test: 19  Fair to poor exercise capacity 4.6 METs  Abnormal ECG portion of stress test with 1 mm ST depression consistent with  ischemia. Nuclear images are normal with normal LVEF, normal wall motion, and no  reversible defects. Overall, this is a low to intermediate risk study due to abnormal ekgs. Cath: 19  FINDINGS            LVGRAM     LVEDP 13   GRADIENT ACROSS AORTIC VALVE No gradient   LV FUNCTION EF 65-75%   WALL MOTION Hyperdynamic   MITRAL REGURGITATION Mild         CORONARY ARTERIES     LM <10% stenosis. LAD Prox 30% stenosis.   Mid 30-40% stenosis. Distal 10% stenosis. LAD wraps around apex. D1 is large vessel with ostial 30-40% stenosis. LCX Small vessel. OM1 is small vessel. OM2 is largest OM and has prox-mid 75% stenosis. RCA Dominant. Prox vessel ulcerated plaque noted with prox mid 30-40% stenosis. Mid-distal 50% stenosis. Ulcerated plaque was followed on serial angio in cath lab due to concern may have been raised by catheter. There was no limitation in flow, staining, steady ekg changes (other than w/ contrast), and no cp during case. CONCLUSIONS:      Cad/ashd, mainly in LCx and RCA, will treat medially  Patient was intolerant of metoprolol, will add atenolol in its place and add plavix  Ulcerated plaque in RCA, treat with dapt, aspirin and plavix       Studies:     Assessment      1. Coronary artery disease involving native coronary artery of native heart without angina pectoris               Plan   1. Continue current medications, check your med list at home, call us back with any discrepancies   2. Liver and lipids before the holidays  3. Follow up in 1 year      Scribe's attestation: This note was scribed in the presence of Dr. Dianna Kelly by Felicia Reyes RN        Thank you for allowing us to participate in the care of Nirmal . Please call me with any questions [phone]. Dianna Kelly MD, [facility]   Interventional Cardiologist  [facility] [address]  [phone] [facility]  [phone] [facility]  10/6/2021 1:15 PM    I will address the patient's cardiac risk factors and adjusted pharmacologic treatment as needed. In addition, I have reinforced the need for patient directed risk factor modification. Tobacco use was discussed with the patient and educated on the negative effects and was asked not to use. All questions and concerns were addressed to the patient/family. Alternatives to my treatment were discussed.      I, Dr Dianna Kelly, personally performed the services described in this documentation, as scribed by the above signed scribe in my presence. It is both accurate and complete to my knowledge. I agree with the details independently gathered by the clinical support staff and the scribed note accurately describes my personal service to the patient.

## 2021-10-06 ENCOUNTER — OFFICE VISIT (OUTPATIENT)
Dept: CARDIOLOGY CLINIC | Age: 66
End: 2021-10-06
Payer: COMMERCIAL

## 2021-10-06 VITALS
HEIGHT: 66 IN | DIASTOLIC BLOOD PRESSURE: 72 MMHG | BODY MASS INDEX: 22.5 KG/M2 | WEIGHT: 140 LBS | OXYGEN SATURATION: 94 % | HEART RATE: 74 BPM | SYSTOLIC BLOOD PRESSURE: 132 MMHG

## 2021-10-06 DIAGNOSIS — I25.10 CORONARY ARTERY DISEASE INVOLVING NATIVE CORONARY ARTERY OF NATIVE HEART WITHOUT ANGINA PECTORIS: ICD-10-CM

## 2021-10-06 PROCEDURE — 93000 ELECTROCARDIOGRAM COMPLETE: CPT | Performed by: INTERNAL MEDICINE

## 2021-10-06 PROCEDURE — 99213 OFFICE O/P EST LOW 20 MIN: CPT | Performed by: INTERNAL MEDICINE

## 2021-10-06 NOTE — LETTER
[facility] [address]  Phone: [phone]  Fax: [address] Leslie Sifuentes MD    October 7, 2021     Aliyah Bacon MD  . [facility] [id number]    Patient: Demetri Parsons   MR Number: [id number]   YOB: 1955   Date of Visit: 10/6/2021       Dear Aliyah Bacon:    Thank you for referring Carin Waterman to me for evaluation/treatment. Below are the relevant portions of my assessment and plan of care. If you have questions, please do not hesitate to call me. I look forward to following Aliya Jackson along with you.     Sincerely,      Mook Amanda MD

## 2021-11-23 ENCOUNTER — HOSPITAL ENCOUNTER (OUTPATIENT)
Age: 66
Discharge: HOME | End: 2021-11-23
Payer: COMMERCIAL

## 2021-11-23 ENCOUNTER — HOSPITAL ENCOUNTER (OUTPATIENT)
Dept: GENERAL RADIOLOGY | Age: 66
Discharge: HOME | End: 2021-11-23
Payer: COMMERCIAL

## 2021-11-23 DIAGNOSIS — M54.6 CHRONIC MIDLINE THORACIC BACK PAIN: ICD-10-CM

## 2021-11-23 DIAGNOSIS — G89.29 CHRONIC MIDLINE THORACIC BACK PAIN: ICD-10-CM

## 2021-11-23 LAB
ALBUMIN SERPL-MCNC: 4 G/DL (ref 3.4–5)
ALP BLD-CCNC: 84 U/L (ref 40–129)
ALT SERPL-CCNC: 18 U/L (ref 10–40)
AST SERPL-CCNC: 22 U/L (ref 15–37)
BILIRUB SERPL-MCNC: 0.6 MG/DL (ref 0–1)
BILIRUBIN DIRECT: <0.2 MG/DL (ref 0–0.3)
BILIRUBIN, INDIRECT: NORMAL MG/DL (ref 0–1)
CHOLESTEROL, TOTAL: 137 MG/DL (ref 0–199)
HDLC SERPL-MCNC: 45 MG/DL (ref 40–60)
LDL CHOLESTEROL CALCULATED: 73 MG/DL
TOTAL PROTEIN: 7.4 G/DL (ref 6.4–8.2)
TRIGL SERPL-MCNC: 94 MG/DL (ref 0–150)
VLDLC SERPL CALC-MCNC: 19 MG/DL

## 2021-11-23 PROCEDURE — 80061 LIPID PANEL: CPT

## 2021-11-23 PROCEDURE — 72070 X-RAY EXAM THORAC SPINE 2VWS: CPT

## 2021-11-23 PROCEDURE — 36415 COLL VENOUS BLD VENIPUNCTURE: CPT

## 2021-11-23 PROCEDURE — 80076 HEPATIC FUNCTION PANEL: CPT

## 2021-12-02 ENCOUNTER — TELEPHONE (OUTPATIENT)
Dept: ORTHOPEDIC SURGERY | Age: 66
End: 2021-12-02

## 2021-12-02 ENCOUNTER — OFFICE VISIT (OUTPATIENT)
Dept: ORTHOPEDIC SURGERY | Age: 66
End: 2021-12-02
Payer: COMMERCIAL

## 2021-12-02 VITALS — WEIGHT: 140 LBS | BODY MASS INDEX: 22.5 KG/M2 | HEIGHT: 66 IN

## 2021-12-02 DIAGNOSIS — M47.24 THORACIC SPONDYLOSIS WITH RADICULOPATHY: Primary | ICD-10-CM

## 2021-12-02 PROCEDURE — 99203 OFFICE O/P NEW LOW 30 MIN: CPT | Performed by: PHYSICIAN ASSISTANT

## 2021-12-02 RX ORDER — GABAPENTIN 300 MG/1
300 CAPSULE ORAL NIGHTLY
Qty: 30 CAPSULE | Refills: 0 | Status: SHIPPED | OUTPATIENT
Start: 2021-12-02 | End: 2022-05-13

## 2021-12-02 NOTE — TELEPHONE ENCOUNTER
LM for patient that MRI is approved.   Faxed everything over to [facility]

## 2021-12-02 NOTE — PROGRESS NOTES
New Patient: LUMBAR SPINE    Referring Provider:  [facility] DRU Mills NP    CHIEF COMPLAINT:    Chief Complaint   Patient presents with    Back Pain     thoracic       HISTORY OF PRESENT ILLNESS:     Ms. Kalyn Babcock  is a pleasant 61 y.o. female here for consultation regarding her thoracic back pain. She states her pain began insidiously about 4 years ago. Her pain has steadily increased since then. She rates her back pain 9/10 and leg pain 0/10. She describes the pain as aching and stabbing at times. Her back pain is constant. Pain is worse with prolonged sitting or standing and improved some with change in positioning or lying down. Pain is most severe in her mid to lower thoracic spine. She notes intermittent neck and interscapular pain that began recently, but is less severe than her lower thoracic pain. She denies lower extremity radicular pain, numbness or weakness. She denies saddle numbness or bowel or bladder dysfunction. The pain occasionally disrupts her sleep. She sees Rheumatology.     Current/Past Treatment:   · Physical Therapy: Yes, without substantial improvement  · Chiropractic:  No   · Injection:  No   · Medications:  Ultram     Past Medical History:   Past Medical History:   Diagnosis Date    Chest pressure 03/2019    abn EKG    Depression     High cholesterol     Hypertension 2016    RA (rheumatoid arthritis) ([facility] [address]         Past Surgical History:     Past Surgical History:   Procedure Laterality Date    CARDIAC CATHETERIZATION  03/25/2019    Non Obs CAD, Medical management    COLONOSCOPY      DIAGNOSTIC CARDIAC CATH LAB PROCEDURE      OTHER SURGICAL HISTORY  10/21/2015    excision of skin lesion on scalp    TUBAL LIGATION         Current Medications:     Current Outpatient Medications:     atorvastatin (LIPITOR) 40 MG tablet, TAKE 1 TABLET DAILY, Disp: 90 tablet, Rfl: 0    atenolol (TENORMIN) 25 MG tablet, TAKE 1 TABLET DAILY, Disp: 90 tablet, Rfl: 3    escitalopram (LEXAPRO) 20 MG tablet, Take 20 mg by mouth daily, Disp: , Rfl:     nitroGLYCERIN (NITROSTAT) 0.4 MG SL tablet, up to max of 3 total doses. If no relief after 1 dose, call 911., Disp: 25 tablet, Rfl: 3    losartan-hydrochlorothiazide (HYZAAR) 100-12.5 MG per tablet, Take 1 tablet by mouth daily , Disp: 90 tablet, Rfl: 1    ENBREL SURECLICK 50 MG/ML SOAJ, , Disp: , Rfl:     aspirin 81 MG chewable tablet, Take 81 mg by mouth daily , Disp: , Rfl:     citalopram (CELEXA) 20 MG tablet, Take 20 mg by mouth daily. , Disp: , Rfl:     Allergies:  Plavix [clopidogrel] and Methotrexate    Social History:    reports that she has been smoking cigarettes. She has a 25.00 pack-year smoking history. She has never used smokeless tobacco. She reports current alcohol use. She reports that she does not use drugs. Family History:   Family History   Problem Relation Age of Onset    Heart Attack Maternal Uncle     Stroke Maternal Grandmother        REVIEW OF SYSTEMS: Full ROS noted & scanned   CONSTITUTIONAL: Denies unexplained weight loss, fevers, chills or fatigue  NEUROLOGICAL: Denies unsteady gait or progressive weakness  MUSCULOSKELETAL: Denies joint swelling or redness  PSYCHOLOGICAL: Denies anxiety, depression   SKIN: Denies skin changes, delayed healing, rash, itching   HEMATOLOGIC: Denies easy bleeding or bruising  ENDOCRINE: Denies excessive thirst, urination, heat/cold  RESPIRATORY: Denies current dyspnea, cough  GI: Denies nausea, vomiting, diarrhea   : Denies bowel or bladder issues      PHYSICAL EXAM:    Vitals: Height 5' 5.98\" (1.676 m), weight 140 lb (63.5 kg), not currently breastfeeding. GENERAL EXAM:  · General Apparence: Patient is adequately groomed with no evidence of malnutrition. · Orientation: The patient is oriented to time, place and person. · Mood & Affect:The patient's mood and affect are appropriate. · Vascular: Examination reveals no swelling tenderness in upper or lower extremities.  Good capillary refill. · Lymphatic: The lymphatic examination bilaterally reveals all areas to be without enlargement or induration  · Sensation: Sensation is intact without deficit  · Coordination/Balance: Good coordination. Tandem walking normal.     CERVICAL EXAMINATION:  · Inspection: Local inspection shows no step-off or bruising. Cervical alignment is normal.     · Palpation: No evidence of tenderness at the midline, and trapezius. Paraspinal tenderness is not present. There is no step-off or paraspinal spasm. · Range of Motion: Cervical flexion, extension, and rotation are mildly reduced with pain. · Strength: 5/5 bilateral upper extremities   · Special Tests:    ·   Spurling's & Ashley's negative bilaterally. ·  Cubital and Carpal tunnel Tinel's negative bilaterally. · Skin:There are no rashes, ulcerations or lesions in right & left upper extremities. · Reflexes: Bilaterally triceps, biceps and brachioradialis are 2+. Clonus absent bilaterally at the feet. · Additional Examinations:       · RIGHT UPPER EXTREMITY:  Inspection/examination of the right upper extremity does not show any tenderness, deformity or injury. Range of motion is unremarkable. There is no gross instability. There are no rashes, ulcerations or lesions. Strength and tone are normal.  · LEFT UPPER EXTREMITY: Inspection/examination of the left upper extremity does not show any tenderness, deformity or injury. Range of motion is unremarkable. There is no gross instability. There are no rashes, ulcerations or lesions. Strength and tone are normal.    LUMBAR/SACRAL EXAMINATION:  · Inspection: Local inspection shows no step-off or bruising. Lumbar alignment is normal.  Sagittal and Coronal balance is neutral.      · Palpation:   No evidence of tenderness at the midline. No tenderness bilaterally at the paraspinal or trochanters. There is no step-off or paraspinal spasm.    · Range of Motion: Lumbar flexion, extension and rotation are mildly limited due to pain. · Strength:   Strength testing is 5/5 in all muscle groups tested. · Special Tests:   Straight leg raise and crossed SLR negative. Leg length and pelvis level. · Skin: There are no rashes, ulcerations or lesions. · Reflexes: Reflexes are symmetrically 2+ at the patellar and ankle tendons. Clonus absent bilaterally at the feet. · Gait & station: normal, patient ambulates without assistance    · Additional Examinations:   · RIGHT LOWER EXTREMITY: Inspection/examination of the right lower extremity does not show any tenderness, deformity or injury. Range of motion is unremarkable. There is no gross instability. There are no rashes, ulcerations or lesions. Strength and tone are normal.  · LEFT LOWER EXTREMITY:  Inspection/examination of the left lower extremity does not show any tenderness, deformity or injury. Range of motion is unremarkable. There is no gross instability. There are no rashes, ulcerations or lesions. Strength and tone are normal.    Diagnostic Testing:    I reviewed AP and lateral xray images of her thoracic spine from 11/23/21. They show mild spondylosis with hypertrophic spurring at multiple mid thoracic levels, with bridging syndesmophytes. No acute abnormality noted. Impression:   Thoracic spondylosis    Plan:    We discussed treatment options including observation, Gabapentin, physical therapy, epidural injections and additional imaging. She wishes to proceed with an MRI of her thoracic spine and Gabapentin at night. She will return to discuss additional treatment options once her MRI is complete. Patient examined and note dictated by Dian Bright PA-C. Patient also seen and examined by Dr. Stephani Teague.

## 2021-12-03 ENCOUNTER — TELEPHONE (OUTPATIENT)
Dept: CARDIOLOGY CLINIC | Age: 66
End: 2021-12-03

## 2021-12-03 RX ORDER — ATORVASTATIN CALCIUM 40 MG/1
TABLET, FILM COATED ORAL
Qty: 90 TABLET | Refills: 3 | Status: SHIPPED | OUTPATIENT
Start: 2021-12-03

## 2021-12-03 NOTE — TELEPHONE ENCOUNTER
----- Message from Gisell Calderon MD sent at 12/1/2021  9:15 AM EST -----  Let patient know their lipid test is normal, continue current meds, no new orders or changes at this time. Thanks.

## 2021-12-23 ENCOUNTER — TELEPHONE (OUTPATIENT)
Dept: ORTHOPEDIC SURGERY | Age: 66
End: 2021-12-23

## 2021-12-23 DIAGNOSIS — M47.24 THORACIC SPONDYLOSIS WITH RADICULOPATHY: Primary | ICD-10-CM

## 2021-12-23 NOTE — TELEPHONE ENCOUNTER
----- Message from Salvador Day MD sent at 12/21/2021  7:59 AM EST -----  Thoracic MRI  Disc herniation  Could try epidural injection or come in to discuss

## 2022-05-13 ENCOUNTER — APPOINTMENT (OUTPATIENT)
Dept: GENERAL RADIOLOGY | Age: 67
End: 2022-05-13
Payer: COMMERCIAL

## 2022-05-13 ENCOUNTER — APPOINTMENT (OUTPATIENT)
Dept: CT IMAGING | Age: 67
End: 2022-05-13
Payer: COMMERCIAL

## 2022-05-13 ENCOUNTER — HOSPITAL ENCOUNTER (EMERGENCY)
Age: 67
Discharge: ANOTHER HOSPITAL | End: 2022-05-13
Attending: STUDENT IN AN ORGANIZED HEALTH CARE EDUCATION/TRAINING PROGRAM
Payer: COMMERCIAL

## 2022-05-13 VITALS
BODY MASS INDEX: 22.76 KG/M2 | SYSTOLIC BLOOD PRESSURE: 123 MMHG | WEIGHT: 145 LBS | DIASTOLIC BLOOD PRESSURE: 72 MMHG | HEIGHT: 67 IN | HEART RATE: 75 BPM | RESPIRATION RATE: 18 BRPM | OXYGEN SATURATION: 90 % | TEMPERATURE: 98.3 F

## 2022-05-13 DIAGNOSIS — S02.413A CLOSED LE FORT III FRACTURE, INITIAL ENCOUNTER (HCC): ICD-10-CM

## 2022-05-13 DIAGNOSIS — R55 SYNCOPE AND COLLAPSE: Primary | ICD-10-CM

## 2022-05-13 DIAGNOSIS — J96.01 ACUTE RESPIRATORY FAILURE WITH HYPOXIA (HCC): ICD-10-CM

## 2022-05-13 DIAGNOSIS — N17.9 ACUTE KIDNEY INJURY (HCC): ICD-10-CM

## 2022-05-13 DIAGNOSIS — I60.9 SUBARACHNOID HEMORRHAGE (HCC): ICD-10-CM

## 2022-05-13 DIAGNOSIS — R77.8 ELEVATED TROPONIN: ICD-10-CM

## 2022-05-13 DIAGNOSIS — J39.2 NASOPHARYNGEAL MASS: ICD-10-CM

## 2022-05-13 DIAGNOSIS — N30.00 ACUTE CYSTITIS WITHOUT HEMATURIA: ICD-10-CM

## 2022-05-13 LAB
A/G RATIO: 1 (ref 1.1–2.2)
ALBUMIN SERPL-MCNC: 3.7 G/DL (ref 3.4–5)
ALP BLD-CCNC: 76 U/L (ref 40–129)
ALT SERPL-CCNC: 19 U/L (ref 10–40)
ANION GAP SERPL CALCULATED.3IONS-SCNC: 13 MMOL/L (ref 3–16)
APTT: 34.7 SEC (ref 26.2–38.6)
AST SERPL-CCNC: 37 U/L (ref 15–37)
BACTERIA: ABNORMAL /HPF
BASOPHILS ABSOLUTE: 0 K/UL (ref 0–0.2)
BASOPHILS RELATIVE PERCENT: 0.6 %
BILIRUB SERPL-MCNC: 0.3 MG/DL (ref 0–1)
BILIRUBIN URINE: NEGATIVE
BLOOD, URINE: ABNORMAL
BUN BLDV-MCNC: 29 MG/DL (ref 7–20)
CALCIUM SERPL-MCNC: 8.6 MG/DL (ref 8.3–10.6)
CHLORIDE BLD-SCNC: 103 MMOL/L (ref 99–110)
CLARITY: CLEAR
CO2: 26 MMOL/L (ref 21–32)
COLOR: YELLOW
CREAT SERPL-MCNC: 1.8 MG/DL (ref 0.6–1.2)
EKG ATRIAL RATE: 74 BPM
EKG DIAGNOSIS: NORMAL
EKG P AXIS: 75 DEGREES
EKG P-R INTERVAL: 142 MS
EKG Q-T INTERVAL: 396 MS
EKG QRS DURATION: 82 MS
EKG QTC CALCULATION (BAZETT): 439 MS
EKG R AXIS: 72 DEGREES
EKG T AXIS: 48 DEGREES
EKG VENTRICULAR RATE: 74 BPM
EOSINOPHILS ABSOLUTE: 0 K/UL (ref 0–0.6)
EOSINOPHILS RELATIVE PERCENT: 0 %
EPITHELIAL CELLS, UA: ABNORMAL /HPF (ref 0–5)
GFR AFRICAN AMERICAN: 34
GFR NON-AFRICAN AMERICAN: 28
GLUCOSE BLD-MCNC: 130 MG/DL (ref 70–99)
GLUCOSE BLD-MCNC: 138 MG/DL (ref 70–99)
GLUCOSE URINE: NEGATIVE MG/DL
HCT VFR BLD CALC: 42.2 % (ref 36–48)
HEMOGLOBIN: 14.3 G/DL (ref 12–16)
INR BLD: 1 (ref 0.88–1.12)
KETONES, URINE: NEGATIVE MG/DL
LEUKOCYTE ESTERASE, URINE: NEGATIVE
LYMPHOCYTES ABSOLUTE: 0.6 K/UL (ref 1–5.1)
LYMPHOCYTES RELATIVE PERCENT: 19.4 %
MCH RBC QN AUTO: 29.9 PG (ref 26–34)
MCHC RBC AUTO-ENTMCNC: 34 G/DL (ref 31–36)
MCV RBC AUTO: 88.1 FL (ref 80–100)
MICROSCOPIC EXAMINATION: YES
MONOCYTES ABSOLUTE: 0.3 K/UL (ref 0–1.3)
MONOCYTES RELATIVE PERCENT: 10.1 %
NEUTROPHILS ABSOLUTE: 2.2 K/UL (ref 1.7–7.7)
NEUTROPHILS RELATIVE PERCENT: 69.9 %
NITRITE, URINE: POSITIVE
PDW BLD-RTO: 13.5 % (ref 12.4–15.4)
PERFORMED ON: ABNORMAL
PH UA: 5.5 (ref 5–8)
PLATELET # BLD: 115 K/UL (ref 135–450)
PMV BLD AUTO: 8.9 FL (ref 5–10.5)
POTASSIUM REFLEX MAGNESIUM: 3.6 MMOL/L (ref 3.5–5.1)
PROTEIN UA: ABNORMAL MG/DL
PROTHROMBIN TIME: 11.4 SEC (ref 9.9–12.7)
RAPID INFLUENZA  B AGN: NEGATIVE
RAPID INFLUENZA A AGN: NEGATIVE
RBC # BLD: 4.79 M/UL (ref 4–5.2)
RBC UA: ABNORMAL /HPF (ref 0–4)
SODIUM BLD-SCNC: 142 MMOL/L (ref 136–145)
SPECIFIC GRAVITY UA: 1.02 (ref 1–1.03)
TOTAL PROTEIN: 7.5 G/DL (ref 6.4–8.2)
TROPONIN: 0.02 NG/ML
TROPONIN: 0.06 NG/ML
URINE TYPE: ABNORMAL
UROBILINOGEN, URINE: 0.2 E.U./DL
WBC # BLD: 3.2 K/UL (ref 4–11)
WBC UA: ABNORMAL /HPF (ref 0–5)

## 2022-05-13 PROCEDURE — 72125 CT NECK SPINE W/O DYE: CPT

## 2022-05-13 PROCEDURE — 84484 ASSAY OF TROPONIN QUANT: CPT

## 2022-05-13 PROCEDURE — 6360000002 HC RX W HCPCS: Performed by: STUDENT IN AN ORGANIZED HEALTH CARE EDUCATION/TRAINING PROGRAM

## 2022-05-13 PROCEDURE — 87086 URINE CULTURE/COLONY COUNT: CPT

## 2022-05-13 PROCEDURE — 70450 CT HEAD/BRAIN W/O DYE: CPT

## 2022-05-13 PROCEDURE — 85025 COMPLETE CBC W/AUTO DIFF WBC: CPT

## 2022-05-13 PROCEDURE — 87804 INFLUENZA ASSAY W/OPTIC: CPT

## 2022-05-13 PROCEDURE — 80053 COMPREHEN METABOLIC PANEL: CPT

## 2022-05-13 PROCEDURE — 2580000003 HC RX 258: Performed by: STUDENT IN AN ORGANIZED HEALTH CARE EDUCATION/TRAINING PROGRAM

## 2022-05-13 PROCEDURE — 87088 URINE BACTERIA CULTURE: CPT

## 2022-05-13 PROCEDURE — 81001 URINALYSIS AUTO W/SCOPE: CPT

## 2022-05-13 PROCEDURE — 96361 HYDRATE IV INFUSION ADD-ON: CPT

## 2022-05-13 PROCEDURE — 90715 TDAP VACCINE 7 YRS/> IM: CPT | Performed by: STUDENT IN AN ORGANIZED HEALTH CARE EDUCATION/TRAINING PROGRAM

## 2022-05-13 PROCEDURE — 85610 PROTHROMBIN TIME: CPT

## 2022-05-13 PROCEDURE — 71045 X-RAY EXAM CHEST 1 VIEW: CPT

## 2022-05-13 PROCEDURE — 70486 CT MAXILLOFACIAL W/O DYE: CPT

## 2022-05-13 PROCEDURE — 87186 SC STD MICRODIL/AGAR DIL: CPT

## 2022-05-13 PROCEDURE — 36415 COLL VENOUS BLD VENIPUNCTURE: CPT

## 2022-05-13 PROCEDURE — 96375 TX/PRO/DX INJ NEW DRUG ADDON: CPT

## 2022-05-13 PROCEDURE — 96365 THER/PROPH/DIAG IV INF INIT: CPT

## 2022-05-13 PROCEDURE — 93005 ELECTROCARDIOGRAM TRACING: CPT | Performed by: STUDENT IN AN ORGANIZED HEALTH CARE EDUCATION/TRAINING PROGRAM

## 2022-05-13 PROCEDURE — 85730 THROMBOPLASTIN TIME PARTIAL: CPT

## 2022-05-13 PROCEDURE — 90471 IMMUNIZATION ADMIN: CPT | Performed by: STUDENT IN AN ORGANIZED HEALTH CARE EDUCATION/TRAINING PROGRAM

## 2022-05-13 PROCEDURE — 99285 EMERGENCY DEPT VISIT HI MDM: CPT

## 2022-05-13 RX ORDER — ONDANSETRON 2 MG/ML
4 INJECTION INTRAMUSCULAR; INTRAVENOUS ONCE
Status: COMPLETED | OUTPATIENT
Start: 2022-05-13 | End: 2022-05-13

## 2022-05-13 RX ORDER — FENTANYL CITRATE 50 UG/ML
25 INJECTION, SOLUTION INTRAMUSCULAR; INTRAVENOUS ONCE
Status: COMPLETED | OUTPATIENT
Start: 2022-05-13 | End: 2022-05-13

## 2022-05-13 RX ORDER — 0.9 % SODIUM CHLORIDE 0.9 %
1000 INTRAVENOUS SOLUTION INTRAVENOUS ONCE
Status: COMPLETED | OUTPATIENT
Start: 2022-05-13 | End: 2022-05-13

## 2022-05-13 RX ADMIN — AMPICILLIN SODIUM AND SULBACTAM SODIUM 3000 MG: 2; 1 INJECTION, POWDER, FOR SOLUTION INTRAMUSCULAR; INTRAVENOUS at 14:03

## 2022-05-13 RX ADMIN — SODIUM CHLORIDE 1000 ML: 9 INJECTION, SOLUTION INTRAVENOUS at 12:09

## 2022-05-13 RX ADMIN — ONDANSETRON HYDROCHLORIDE 4 MG: 2 INJECTION, SOLUTION INTRAMUSCULAR; INTRAVENOUS at 13:07

## 2022-05-13 RX ADMIN — TETANUS TOXOID, REDUCED DIPHTHERIA TOXOID AND ACELLULAR PERTUSSIS VACCINE, ADSORBED 0.5 ML: 5; 2.5; 8; 8; 2.5 SUSPENSION INTRAMUSCULAR at 14:27

## 2022-05-13 RX ADMIN — FENTANYL CITRATE 25 MCG: 50 INJECTION, SOLUTION INTRAMUSCULAR; INTRAVENOUS at 13:08

## 2022-05-13 ASSESSMENT — VISUAL ACUITY
OS: 20/30
OD: 20/200
OU: 20/30

## 2022-05-13 ASSESSMENT — PAIN SCALES - GENERAL
PAINLEVEL_OUTOF10: 10
PAINLEVEL_OUTOF10: 10

## 2022-05-13 ASSESSMENT — PAIN - FUNCTIONAL ASSESSMENT: PAIN_FUNCTIONAL_ASSESSMENT: 0-10

## 2022-05-13 NOTE — ED PROVIDER NOTES
[facility] EMERGENCY DEPARTMENT      CHIEF COMPLAINT  Loss of Consciousness (Pt states she woke up this AM and felt ok, has had ear aches, congestion, cough, diarrhea x3 days. Pt states at some point this AM she went to the garage and had LOC. Pt unsure of what time it occured or how long she was unconscious. Pt cousin states that at 56 he call the patient and she stated she \"blacked out\". Pt cousin arrived at approx 56 and found pt sitting on the couch cover in blood on her face. pt reports being incontient of urine and stool during the episode. Pt A&Ox4 on arrival. )     HISTORY OF PRESENT ILLNESS  Jayde Yu is a 61 y.o. female  who presents to the ED complaining of loss of consciousness, headache. Patient states that she has been sick for the past few days, has had congestion cough and diarrhea and earaches. States that at some point this morning she was in the garage and had a loss of consciousness. She does not remember what happened or how long she was down. She takes a baby aspirin, is not otherwise anticoagulated. She denies chest pain or shortness of breath. States that she walked into the garage and saw a puddle of blood on the floor did not know what happened until she looked at herself in the mirror. Currently complaining of a headache. Denies vision changes. Endorses facial pain. She denies any other complaints or concerns. No other complaints, modifying factors or associated symptoms. I have reviewed the following from the nursing documentation.     Past Medical History:   Diagnosis Date    Chest pressure 03/2019    abn EKG    Depression     High cholesterol     Hypertension 2016    RA (rheumatoid arthritis) ([facility] [address]      Past Surgical History:   Procedure Laterality Date    CARDIAC CATHETERIZATION  03/25/2019    Non Obs CAD, Medical management    COLONOSCOPY      DIAGNOSTIC CARDIAC CATH LAB PROCEDURE      OTHER SURGICAL HISTORY  10/21/2015    excision of skin lesion on scalp    TUBAL LIGATION       Family History   Problem Relation Age of Onset    Heart Attack Maternal Uncle     Stroke Maternal Grandmother      Social History     Socioeconomic History    Marital status:      Spouse name: Not on file    Number of children: Not on file    Years of education: Not on file    Highest education level: Not on file   Occupational History    Not on file   Tobacco Use    Smoking status: Current Every Day Smoker     Packs/day: 1.00     Years: 25.00     Pack years: 25.00     Types: Cigarettes    Smokeless tobacco: Never Used   Vaping Use    Vaping Use: Never used   Substance and Sexual Activity    Alcohol use: Yes     Comment: occ    Drug use: Yes     Types: Marijuana Waylon Matthew)     Comment: medical marijuana daily    Sexual activity: Yes     Partners: Male   Other Topics Concern    Not on file   Social History Narrative    Not on file     Social Determinants of Health     Financial Resource Strain:     Difficulty of Paying Living Expenses: Not on file   Food Insecurity:     Worried About Running Out of Food in the Last Year: Not on file    [place] of Food in the Last Year: Not on file   Transportation Needs:     Lack of Transportation (Medical): Not on file    Lack of Transportation (Non-Medical):  Not on file   Physical Activity:     Days of Exercise per Week: Not on file    Minutes of Exercise per Session: Not on file   Stress:     Feeling of Stress : Not on file   Social Connections:     Frequency of Communication with Friends and Family: Not on file    Frequency of Social Gatherings with Friends and Family: Not on file    Attends Zoroastrian Services: Not on file    Active Member of Clubs or Organizations: Not on file    Attends Club or Organization Meetings: Not on file    Marital Status: Not on file   Intimate Partner Violence:     Fear of Current or Ex-Partner: Not on file    Emotionally Abused: Not on file    Physically Abused: Not on file   [address] [place] [address] Sexually Abused: Not on file   Housing Stability:     Unable to Pay for Housing in the Last Year: Not on file    Number of Places Lived in the Last Year: Not on file    Unstable Housing in the Last Year: Not on file     No current facility-administered medications for this encounter. Current Outpatient Medications   Medication Sig Dispense Refill    atorvastatin (LIPITOR) 40 MG tablet TAKE 1 TABLET DAILY 90 tablet 3    atenolol (TENORMIN) 25 MG tablet TAKE 1 TABLET DAILY 90 tablet 3    escitalopram (LEXAPRO) 20 MG tablet Take 20 mg by mouth daily      nitroGLYCERIN (NITROSTAT) 0.4 MG SL tablet up to max of 3 total doses. If no relief after 1 dose, call 911. 25 tablet 3    losartan-hydrochlorothiazide (HYZAAR) 100-12.5 MG per tablet Take 1 tablet by mouth daily  90 tablet 1    ENBREL SURECLICK 50 MG/ML SOAJ       aspirin 81 MG chewable tablet Take 81 mg by mouth daily       citalopram (CELEXA) 20 MG tablet Take 20 mg by mouth daily. Allergies   Allergen Reactions    Plavix [Clopidogrel] Nausea Only    Methotrexate Other (See Comments)     Hair loss       REVIEW OF SYSTEMS  10 systems reviewed, pertinent positives per HPI otherwise noted to be negative. PHYSICAL EXAM  /72   Pulse 75   Temp 98.3 °F (36.8 °C) (Oral)   Resp 18   Ht 5' 7\" (1.702 m)   Wt 145 lb (65.8 kg)   SpO2 90%   BMI 22.71 kg/m²    GENERAL APPEARANCE: Awake and alert. Cooperative. No acute distress. HENT: Significant left periorbital ecchymosis and facial ecchymosis, mucous membranes are dry. NECK: Supple. EYES: PERRL. EOM's grossly intact. HEART/CHEST: RRR. No murmurs. LUNGS: Respirations unlabored. CTAB. Good air exchange. Speaking comfortably in full sentences. ABDOMEN: No tenderness. Soft. Non-distended. No masses. No organomegaly. No guarding or rebound. MUSCULOSKELETAL: No extremity edema. Compartments soft. No deformity. No tenderness in the extremities.   All extremities neurovascularly intact. BACK: No midline tenderness palpation of C, T, or L-spine. No step-offs or obvious deformities. SKIN: Warm and dry. No acute rashes. NEUROLOGICAL: Alert and oriented. CN's 2-12 intact. No gross facial drooping. Strength 5/5, sensation intact. PSYCHIATRIC: Normal mood and affect. LABS  I have reviewed all labs for this visit.    Results for orders placed or performed during the hospital encounter of 05/13/22   Rapid influenza A/B antigens    Specimen: Nasopharyngeal   Result Value Ref Range    Rapid Influenza A Ag Negative Negative    Rapid Influenza B Ag Negative Negative   CBC with Auto Differential   Result Value Ref Range    WBC 3.2 (L) 4.0 - 11.0 K/uL    RBC 4.79 4.00 - 5.20 M/uL    Hemoglobin 14.3 12.0 - 16.0 g/dL    Hematocrit 42.2 36.0 - 48.0 %    MCV 88.1 80.0 - 100.0 fL    MCH 29.9 26.0 - 34.0 pg    MCHC 34.0 31.0 - 36.0 g/dL    RDW 13.5 12.4 - 15.4 %    Platelets 705 (L) 459 - 450 K/uL    MPV 8.9 5.0 - 10.5 fL    Neutrophils % 69.9 %    Lymphocytes % 19.4 %    Monocytes % 10.1 %    Eosinophils % 0.0 %    Basophils % 0.6 %    Neutrophils Absolute 2.2 1.7 - 7.7 K/uL    Lymphocytes Absolute 0.6 (L) 1.0 - 5.1 K/uL    Monocytes Absolute 0.3 0.0 - 1.3 K/uL    Eosinophils Absolute 0.0 0.0 - 0.6 K/uL    Basophils Absolute 0.0 0.0 - 0.2 K/uL   Comprehensive Metabolic Panel w/ Reflex to MG   Result Value Ref Range    Sodium 142 136 - 145 mmol/L    Potassium reflex Magnesium 3.6 3.5 - 5.1 mmol/L    Chloride 103 99 - 110 mmol/L    CO2 26 21 - 32 mmol/L    Anion Gap 13 3 - 16    Glucose 138 (H) 70 - 99 mg/dL    BUN 29 (H) 7 - 20 mg/dL    CREATININE 1.8 (H) 0.6 - 1.2 mg/dL    GFR Non-African American 28 (A) >60    GFR  34 (A) >60    Calcium 8.6 8.3 - 10.6 mg/dL    Total Protein 7.5 6.4 - 8.2 g/dL    Albumin 3.7 3.4 - 5.0 g/dL    Albumin/Globulin Ratio 1.0 (L) 1.1 - 2.2    Total Bilirubin 0.3 0.0 - 1.0 mg/dL    Alkaline Phosphatase 76 40 - 129 U/L    ALT 19 10 - 40 U/L    AST 37 15 - 37 U/L   Protime-INR   Result Value Ref Range    Protime 11.4 9.9 - 12.7 sec    INR 1.00 0.88 - 1.12   APTT   Result Value Ref Range    aPTT 34.7 26.2 - 38.6 sec   Urinalysis with Microscopic   Result Value Ref Range    Color, UA Yellow Straw/Yellow    Clarity, UA Clear Clear    Glucose, Ur Negative Negative mg/dL    Bilirubin Urine Negative Negative    Ketones, Urine Negative Negative mg/dL    Specific Gravity, UA 1.025 1.005 - 1.030    Blood, Urine SMALL (A) Negative    pH, UA 5.5 5.0 - 8.0    Protein, UA TRACE (A) Negative mg/dL    Urobilinogen, Urine 0.2 <2.0 E.U./dL    Nitrite, Urine POSITIVE (A) Negative    Leukocyte Esterase, Urine Negative Negative    Microscopic Examination YES     Urine Type NotGiven     WBC, UA 3-5 0 - 5 /HPF    RBC, UA 0-2 0 - 4 /HPF    Epithelial Cells, UA 0-1 0 - 5 /HPF    Bacteria, UA 4+ (A) None Seen /HPF   Troponin   Result Value Ref Range    Troponin 0.06 (H) <0.01 ng/mL   Troponin   Result Value Ref Range    Troponin 0.02 (H) <0.01 ng/mL   POCT Glucose   Result Value Ref Range    POC Glucose 130 (H) 70 - 99 mg/dl    Performed on ACCU-CHEK    EKG 12 Lead   Result Value Ref Range    Ventricular Rate 74 BPM    Atrial Rate 74 BPM    P-R Interval 142 ms    QRS Duration 82 ms    Q-T Interval 396 ms    QTc Calculation (Bazett) 439 ms    P Axis 75 degrees    R Axis 72 degrees    T Axis 48 degrees    Diagnosis        Poor data quality, interpretation may be adversely affectedNormal sinus rhythmNonspecific ST abnormalityAbnormal ECGWhen compared with ECG of 25-MAR-2019 11:07,QT has shortenedConfirmed by Cathy Huff ([id number]) on 5/13/2022 6:35:50 PM       ECG  The Ekg interpreted by me shows  normal sinus rhythm with a rate of 74  Axis is   Normal  QTc is  normal  Intervals and Durations are unremarkable.       ST Segments: nonspecific changes  Nonspecific T wave abnormalities new compared to previous performed 3/25/2019    RADIOLOGY  XR CHEST PORTABLE   Final Result      No acute findings in the chest.         CT CERVICAL SPINE WO CONTRAST   Final Result   No acute abnormality of the cervical spine. All old fractures described   above are recent facial bone fractures. The facial bones are fractionally   visualized on this study. RECOMMENDATIONS:   Please see facial bone CT report. Unavailable         CT FACIAL BONES WO CONTRAST   Final Result   Left LeFort 3 fracture of the facial bones with significant medial   displacement of the left zygoma and left nasal bones. .      RECOMMENDATIONS:   Maxillofacial surgeon consultation. Unavailable         CT HEAD WO CONTRAST   Final Result   Subtle increased density seen in the inter hemispheric fissure on the right. This could represent small amount of subarachnoid hemorrhage. Facial bone fractures on the left with soft tissue hematoma. Please see   facial bone CT report. Mass is seen in the nasopharynx. 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 Recommend direct visualization when the patient is   able      Results discussed with Sussy Flores by Kayla Sanchez. Judyth Hammans, MD at 12:15 pm   on 5/13/2022           ED COURSE/MDM  Patient seen and evaluated. Old records reviewed. Labs and imaging reviewed and results discussed with patient. Patient is a 70-year-old female, presenting with concerns for syncopal episode, facial pain and headache. Full HPI as detailed above. Upon arrival in the ED, vitals remarkable for hypoxia, patient placed on 2 L of supplemental oxygen with improvement. She does have significant ecchymosis in the left periorbital area, extraocular movements seem to be intact. Pupils are equal round and reactive to light. She has no tenderness palpation in midline C-spine however was placed in a c-collar upon arrival.  She has some broken teeth as well. Labs are performed, she does have an acute kidney injury with creatinine of 1.8. Troponin is elevated 0.06.   CT of the head, C-spine, and face were performed that did show a LeFort III fracture, also nasopharyngeal mass, and a small subarachnoid hemorrhage. Patient was treated with Unasyn, transferred to [facility] after consultation with the trauma team and the ED physician there. C-spine imaging was negative, C-spine was cleared here in the department clinically. Also incidentally was found to have evidence of urinary tract infection had already been dosed with Unasyn at that point in time. Patient will be transferred to [facility] for further work-up and treatment of her multiple facial fractures, subarachnoid hemorrhage,, syncope work-up, UTI. The total Critical Care time is 45 minutes which excludes separately billable procedures. During the patient's ED course, the patient was given:  Medications   0.9 % sodium chloride bolus (0 mLs IntraVENous Stopped 5/13/22 1310)   fentaNYL (SUBLIMAZE) injection 25 mcg (25 mcg IntraVENous Given 5/13/22 1308)   ondansetron (ZOFRAN) injection 4 mg (4 mg IntraVENous Given 5/13/22 1307)   ampicillin-sulbactam (UNASYN) 3000 mg ivpb minibag (0 mg IntraVENous Stopped 5/13/22 1433)   Tetanus-Diphth-Acell Pertussis (BOOSTRIX) injection 0.5 mL (0.5 mLs IntraMUSCular Given 5/13/22 1427)        CLINICAL IMPRESSION  1. Syncope and collapse    2. Acute respiratory failure with hypoxia (HCC)    3. Subarachnoid hemorrhage ([facility] [address]    4. Nasopharyngeal mass    5. Closed Le Fort III fracture, initial encounter ([facility] [address]    6. Elevated troponin    7. Acute kidney injury ([address]    8. Acute cystitis without hematuria        Blood pressure 123/72, pulse 75, temperature 98.3 °F (36.8 °C), temperature source Oral, resp. rate 18, height 5' 7\" (1.702 m), weight 145 lb (65.8 kg), SpO2 90 %, not currently breastfeeding. DISPOSITION  Leroy Alcaraz was transferred to [facility] in stable condition. Patient was given scripts for the following medications.  I counseled patient how to take these medications. Discharge Medication List as of 5/13/2022  2:51 PM          Follow-up with:  No follow-up provider specified. DISCLAIMER: This chart was created using Dragon dictation software. Efforts were made by me to ensure accuracy, however some errors may be present due to limitations of this technology and occasionally words are not transcribed correctly.        Peter Lorenzo MD  05/13/22 [id number]

## 2022-05-13 NOTE — ED NOTES
Dr Farrah Cornelius with Radiology called to speak with Dr Brad Badillo at this time.       Lisseth Palma, ILYA  05/13/22 0163

## 2022-05-15 LAB
ORGANISM: ABNORMAL
URINE CULTURE, ROUTINE: ABNORMAL

## 2022-05-15 NOTE — RESULT ENCOUNTER NOTE
Result reviewed, no further treatment needed. Patient has been started on antibiotics in the ED and transferred to , infection is pansensitive.

## 2022-07-28 NOTE — PROGRESS NOTES
[facility]   CARDIAC EVALUATION NOTE  [phone]      PCP:  Noe Kasper MD    Reason for Consultation/Chief Complaint: follow up for HTN, CAD    Subjective   History of Present Illness:  Amanda Aguilar is a 61 y.o. patient with a history of moderate CAD who presents referred by Dr. Manuela Zhang for abnormal EKG.  1/20/19 EKG showed SR, non specific ST depression, 73 bpm.  She has seen a cardiologist in the past, 6-7 years ago, careeverywhere records were reviewed and it appears she saw dr Lila Arguello in past.   She states she has had chest pressure x 6 months. Happens daily. She can be sitting or be exerting when it happens. Middle of chest, pressure. She states this is new over the last 6 months. Stress test 2/11/2019 was abnormal due to ST depression on EKG, normal perfusion. Echo 02/11/19 showed EF 55-60%. Coronary angiography on 3/25/19 showed moderate CAD, medical management. OV 10/6/21 she reported she felt well overall. She had occasional mild CP but this is not bothersome. She denied palpitations, dizziness, SOB or syncope. Today she presents for further evaluation and treatment for hypertension and reports that she has been doing better since May 2022. She had a syncaple episode and fell to the ground but has fully recovered since then . Denies chest pain and edema. Has occ sob. Past Medical History:   has a past medical history of Chest pressure, Depression, High cholesterol, History of facial surgery, Hypertension, and RA (rheumatoid arthritis) ([facility] [address] Surgical History:   has a past surgical history that includes Tubal ligation; Colonoscopy; other surgical history (10/21/2015); Cardiac catheterization (03/25/2019); and Diagnostic Cardiac Cath Lab Procedure. Social History: retired payroll. reports that she has been smoking Cigarettes. She has been smoking about 1.00 pack per day. She does not have any smokeless tobacco history on file.  She reports that she drinks alcohol. She reports that she does not use drugs. Family History:  Mom, dad with no heart disease. Both passed of CA  No sibling with heart disease    Home Medications:  Were reviewed and are listed in nursing record and/or below  Prior to Admission medications    Medication Sig Start Date End Date Taking? Authorizing Provider   atorvastatin (LIPITOR) 40 MG tablet TAKE 1 TABLET DAILY 12/3/21  Yes Gm Badillo MD   escitalopram (LEXAPRO) 20 MG tablet Take 20 mg by mouth daily   Yes Historical Provider, MD   nitroGLYCERIN (NITROSTAT) 0.4 MG SL tablet up to max of 3 total doses. If no relief after 1 dose, call 911. 2/12/19  Yes Gm Badillo MD   ENBREL SURECLICK 50 MG/ML SOAJ  38/1/49  Yes Historical Provider, MD   aspirin 81 MG chewable tablet Take 81 mg by mouth daily    Yes Historical Provider, MD   citalopram (CELEXA) 20 MG tablet Take 20 mg by mouth daily. Patient not taking: Reported on 7/29/2022    Historical Provider, MD          Allergies:  Plavix [clopidogrel] and Methotrexate     Review of Systems:   A 14 point review of symptoms completed. Pertinent positives identified in the HPI, all other review of symptoms negative as below.       Objective   PHYSICAL EXAM:    Vitals:    07/29/22 1053   BP: 104/60   Pulse: 94   SpO2: 99%      Weight: 138 lb (62.6 kg)         General Appearance:  Alert, cooperative, no distress, appears stated age   Head:  Normocephalic, without obvious abnormality, atraumatic   Eyes:  PERRL, conjunctiva/corneas clear   Nose: Nares normal, no drainage or sinus tenderness   Throat: Lips, mucosa, and tongue normal   Neck: Supple, symmetrical, trachea midline, no adenopathy, thyroid: not enlarged, symmetric, no tenderness/mass/nodules, no carotid bruit or JVD   Lungs:   Clear to auscultation bilaterally, respirations unlabored   Chest Wall:  No deformity or tenderness   Heart:  Regular rate and rhythm, S1, S2 normal, no murmur, rub or gallop   Abdomen:   Soft, non-tender, bowel sounds active all four quadrants,  no masses, no organomegaly   Extremities: Extremities normal, atraumatic, no cyanosis or edema   Pulses: 2+ and symmetric   Skin: Skin color, texture, turgor normal, no rashes or lesions   Pysch: Normal mood and affect   Neurologic: Normal gross motor and sensory exam.         Labs   CBC:   Lab Results   Component Value Date/Time    WBC 3.2 2022 11:25 AM    RBC 4.79 2022 11:25 AM    HGB 14.3 2022 11:25 AM    HCT 42.2 2022 11:25 AM    MCV 88.1 2022 11:25 AM    RDW 13.5 2022 11:25 AM     2022 11:25 AM     CMP:  Lab Results   Component Value Date/Time     2022 11:25 AM    K 3.6 2022 11:25 AM     2022 11:25 AM    CO2 26 2022 11:25 AM    BUN 29 2022 11:25 AM    CREATININE 1.8 2022 11:25 AM    GFRAA 34 2022 11:25 AM    AGRATIO 1.0 2022 11:25 AM    LABGLOM 28 2022 11:25 AM    GLUCOSE 138 2022 11:25 AM    PROT 7.5 2022 11:25 AM    PROT 7.4 2020 12:00 AM    CALCIUM 8.6 2022 11:25 AM    BILITOT 0.3 2022 11:25 AM    ALKPHOS 76 2022 11:25 AM    AST 37 2022 11:25 AM    ALT 19 2022 11:25 AM         Cardiac Data     Last EK19 EKG showed SR, non specific ST depression, 73 bpm.     2022 nsr nst    Echo: 19   Summary   LV systolic function is normal with EF estimated at 55-60 %. No regional wall motion abnormalities are noted. There is mild concentric left ventricular hypertrophy. Normal diastolic function with normal filling pressure. Aortic valve appears mildly sclerotic but opens adequately. Inadequate tricuspid regurgitation to estimate systolic pulmonary artery   pressure. Stress Test: 19  Fair to poor exercise capacity 4.6 METs  Abnormal ECG portion of stress test with 1 mm ST depression consistent with  ischemia.   Nuclear images are normal with normal LVEF, normal wall motion, and no reversible defects. Overall, this is a low to intermediate risk study due to abnormal ekgs. Cath: 03/25/19  FINDINGS            LVGRAM     LVEDP 13   GRADIENT ACROSS AORTIC VALVE No gradient   LV FUNCTION EF 65-75%   WALL MOTION Hyperdynamic   MITRAL REGURGITATION Mild         CORONARY ARTERIES     LM <10% stenosis. LAD Prox 30% stenosis. Mid 30-40% stenosis. Distal 10% stenosis. LAD wraps around apex. D1 is large vessel with ostial 30-40% stenosis. LCX Small vessel. OM1 is small vessel. OM2 is largest OM and has prox-mid 75% stenosis. RCA Dominant. Prox vessel ulcerated plaque noted with prox mid 30-40% stenosis. Mid-distal 50% stenosis. Ulcerated plaque was followed on serial angio in cath lab due to concern may have been raised by catheter. There was no limitation in flow, staining, steady ekg changes (other than w/ contrast), and no cp during case. CONCLUSIONS:      Cad/ashd, mainly in LCx and RCA, will treat medially  Patient was intolerant of metoprolol, will add atenolol in its place and add plavix  Ulcerated plaque in RCA, treat with dapt, aspirin and plavix       Studies:     Assessment        1. Coronary artery disease involving native coronary artery of native heart without angina pectoris    2. Hypertension, unspecified type    3. High cholesterol                 Plan   Echo to evaluate for  echo for heart strength, abnl ekg , sob   Stress myoview to evaluate for dizziness, syncope, sob, abnl ekg   Call [phone] to schedule these tests  2 week cardiac monitor for syncope-will mail to pt home  TSH, liver and lipids CBC BMP   TAKE Hyzaar 50-12.5mg 1 tab in the morning  STOP Hyzaar 100-12.5 mg tab. Follow up with NP 3 months            Scribe's attestation: This note was scribed in the presence of Dr. Abril Yoder MD   by Isrrael Calderon LPN        Thank you for allowing us to participate in the care of Amanda Aguilar.  Please call me with any questions [phone]. Jose Herrera MD, [facility]   Interventional Cardiologist  [place] [address]  [phone] [facility]  [phone] [address] [place] [address]  7/29/2022 11:29 AM    I will address the patient's cardiac risk factors and adjusted pharmacologic treatment as needed. In addition, I have reinforced the need for patient directed risk factor modification. Tobacco use was discussed with the patient and educated on the negative effects and was asked not to use. All questions and concerns were addressed to the patient/family. Alternatives to my treatment were discussed. I, Dr Jose Herrera, personally performed the services described in this documentation, as scribed by the above signed scribe in my presence. It is both accurate and complete to my knowledge. I agree with the details independently gathered by the clinical support staff and the scribed note accurately describes my personal service to the patient.

## 2022-07-29 ENCOUNTER — OFFICE VISIT (OUTPATIENT)
Dept: CARDIOLOGY CLINIC | Age: 67
End: 2022-07-29
Payer: COMMERCIAL

## 2022-07-29 VITALS
OXYGEN SATURATION: 99 % | HEIGHT: 67 IN | BODY MASS INDEX: 21.66 KG/M2 | HEART RATE: 94 BPM | DIASTOLIC BLOOD PRESSURE: 60 MMHG | SYSTOLIC BLOOD PRESSURE: 104 MMHG | WEIGHT: 138 LBS

## 2022-07-29 DIAGNOSIS — I25.10 CORONARY ARTERY DISEASE INVOLVING NATIVE CORONARY ARTERY OF NATIVE HEART WITHOUT ANGINA PECTORIS: Primary | ICD-10-CM

## 2022-07-29 DIAGNOSIS — E78.00 HIGH CHOLESTEROL: ICD-10-CM

## 2022-07-29 DIAGNOSIS — R42 DIZZINESS: ICD-10-CM

## 2022-07-29 DIAGNOSIS — I10 HYPERTENSION, UNSPECIFIED TYPE: ICD-10-CM

## 2022-07-29 PROCEDURE — 99214 OFFICE O/P EST MOD 30 MIN: CPT | Performed by: INTERNAL MEDICINE

## 2022-07-29 RX ORDER — LOSARTAN POTASSIUM AND HYDROCHLOROTHIAZIDE 12.5; 5 MG/1; MG/1
1 TABLET ORAL DAILY
Qty: 90 TABLET | Refills: 1 | Status: SHIPPED | OUTPATIENT
Start: 2022-07-29 | End: 2022-10-31 | Stop reason: ALTCHOICE

## 2022-07-29 NOTE — PATIENT INSTRUCTIONS
Plan   Echo to evaluate for  echo for heart strength  Stress myoview to evaluate for dizziness  Call [phone] to schedule these tests  2 week cardiac monitor for syncope-will mail to pt home  TSH, liver and lipids CBC BMP   TAKE Hyzaar 50-12.5mg 1 tab in the morning  STOP Hyzaar 100-12.5 mg tab.   Follow up with NP 3 months

## 2022-08-01 ENCOUNTER — TELEPHONE (OUTPATIENT)
Dept: CARDIOLOGY CLINIC | Age: 67
End: 2022-08-01

## 2022-08-01 NOTE — TELEPHONE ENCOUNTER
Monitor company [employer]  Length of monitor 14 days/2 weeks  Monitor ordered by SRRUSS  Patch ID [id number]-Mailed to pt home  Device number [id number] -mailed to pt home  Activation successful prior to pt leaving hospital? NA  Instructions given to Rj Borrego. She verbalized understanding. All questions answered to the best of my ability.

## 2022-08-11 ENCOUNTER — HOSPITAL ENCOUNTER (OUTPATIENT)
Age: 67
Discharge: HOME | End: 2022-08-11
Payer: MEDICARE

## 2022-08-11 DIAGNOSIS — E78.00 HIGH CHOLESTEROL: ICD-10-CM

## 2022-08-11 LAB
ALBUMIN SERPL-MCNC: 3.9 G/DL (ref 3.4–5)
ALP BLD-CCNC: 93 U/L (ref 40–129)
ALT SERPL-CCNC: 12 U/L (ref 10–40)
ANION GAP SERPL CALCULATED.3IONS-SCNC: 10 MMOL/L (ref 3–16)
AST SERPL-CCNC: 20 U/L (ref 15–37)
BASOPHILS ABSOLUTE: 0.1 K/UL (ref 0–0.2)
BASOPHILS RELATIVE PERCENT: 1.2 %
BILIRUB SERPL-MCNC: 0.4 MG/DL (ref 0–1)
BILIRUBIN DIRECT: <0.2 MG/DL (ref 0–0.3)
BILIRUBIN, INDIRECT: NORMAL MG/DL (ref 0–1)
BUN BLDV-MCNC: 10 MG/DL (ref 7–20)
CALCIUM SERPL-MCNC: 9.1 MG/DL (ref 8.3–10.6)
CHLORIDE BLD-SCNC: 102 MMOL/L (ref 99–110)
CO2: 29 MMOL/L (ref 21–32)
CREAT SERPL-MCNC: 0.7 MG/DL (ref 0.6–1.2)
EOSINOPHILS ABSOLUTE: 0.2 K/UL (ref 0–0.6)
EOSINOPHILS RELATIVE PERCENT: 3.7 %
GFR AFRICAN AMERICAN: >60
GFR NON-AFRICAN AMERICAN: >60
GLUCOSE BLD-MCNC: 93 MG/DL (ref 70–99)
HCT VFR BLD CALC: 39.7 % (ref 36–48)
HEMOGLOBIN: 13.6 G/DL (ref 12–16)
LYMPHOCYTES ABSOLUTE: 1.5 K/UL (ref 1–5.1)
LYMPHOCYTES RELATIVE PERCENT: 33.9 %
MCH RBC QN AUTO: 30.2 PG (ref 26–34)
MCHC RBC AUTO-ENTMCNC: 34.2 G/DL (ref 31–36)
MCV RBC AUTO: 88.1 FL (ref 80–100)
MONOCYTES ABSOLUTE: 0.5 K/UL (ref 0–1.3)
MONOCYTES RELATIVE PERCENT: 11.2 %
NEUTROPHILS ABSOLUTE: 2.2 K/UL (ref 1.7–7.7)
NEUTROPHILS RELATIVE PERCENT: 50 %
PDW BLD-RTO: 13.1 % (ref 12.4–15.4)
PLATELET # BLD: 160 K/UL (ref 135–450)
PMV BLD AUTO: 7.7 FL (ref 5–10.5)
POTASSIUM SERPL-SCNC: 3.9 MMOL/L (ref 3.5–5.1)
RBC # BLD: 4.5 M/UL (ref 4–5.2)
SODIUM BLD-SCNC: 141 MMOL/L (ref 136–145)
TOTAL PROTEIN: 7 G/DL (ref 6.4–8.2)
WBC # BLD: 4.4 K/UL (ref 4–11)

## 2022-08-11 PROCEDURE — 36415 COLL VENOUS BLD VENIPUNCTURE: CPT

## 2022-08-11 PROCEDURE — 80061 LIPID PANEL: CPT

## 2022-08-11 PROCEDURE — 85025 COMPLETE CBC W/AUTO DIFF WBC: CPT

## 2022-08-11 PROCEDURE — 80048 BASIC METABOLIC PNL TOTAL CA: CPT

## 2022-08-11 PROCEDURE — 84443 ASSAY THYROID STIM HORMONE: CPT

## 2022-08-11 PROCEDURE — 80076 HEPATIC FUNCTION PANEL: CPT

## 2022-08-12 LAB
CHOLESTEROL, FASTING: 129 MG/DL (ref 0–199)
HDLC SERPL-MCNC: 41 MG/DL (ref 40–60)
LDL CHOLESTEROL CALCULATED: 68 MG/DL
TRIGLYCERIDE, FASTING: 99 MG/DL (ref 0–150)
TSH REFLEX FT4: 1.43 UIU/ML (ref 0.27–4.2)
VLDLC SERPL CALC-MCNC: 20 MG/DL

## 2022-08-15 ENCOUNTER — TELEPHONE (OUTPATIENT)
Dept: CARDIOLOGY CLINIC | Age: 67
End: 2022-08-15

## 2022-08-15 DIAGNOSIS — R06.02 SOB (SHORTNESS OF BREATH): Primary | ICD-10-CM

## 2022-08-15 DIAGNOSIS — I20.8 OTHER FORMS OF ANGINA PECTORIS (HCC): ICD-10-CM

## 2022-08-15 NOTE — TELEPHONE ENCOUNTER
Attempted to call pt. No answer, unable to leave VM, just rang and rang.  Will attempt to call pt again later

## 2022-08-15 NOTE — TELEPHONE ENCOUNTER
Vincenzo Vasques from central scheduling stated that the diagnostic codes for echo and stress test are not passing medical necessity. Please call pt once codes have been changed to schedule. Thanks.

## 2022-08-15 NOTE — TELEPHONE ENCOUNTER
----- Message from Payton Smith MD sent at 8/11/2022 11:24 AM EDT -----  Let patient know their lab tests are normal, continue current meds, no new orders or changes at this time. Thanks.

## 2022-08-15 NOTE — TELEPHONE ENCOUNTER
----- Message from Claire Ivey MD sent at 8/12/2022  2:34 PM EDT -----  Let patient know their lipid test is normal, continue current meds, no new orders or changes at this time. Thanks.

## 2022-08-23 PROCEDURE — 93272 ECG/REVIEW INTERPRET ONLY: CPT | Performed by: INTERNAL MEDICINE

## 2022-08-24 ENCOUNTER — HOSPITAL ENCOUNTER (OUTPATIENT)
Dept: NUCLEAR MEDICINE | Age: 67
Discharge: HOME | End: 2022-08-24
Payer: MEDICARE

## 2022-08-24 ENCOUNTER — HOSPITAL ENCOUNTER (OUTPATIENT)
Dept: NON INVASIVE DIAGNOSTICS | Age: 67
Discharge: HOME | End: 2022-08-24
Payer: MEDICARE

## 2022-08-24 ENCOUNTER — TELEPHONE (OUTPATIENT)
Dept: CARDIOLOGY CLINIC | Age: 67
End: 2022-08-24

## 2022-08-24 DIAGNOSIS — R06.02 SOB (SHORTNESS OF BREATH): ICD-10-CM

## 2022-08-24 DIAGNOSIS — I20.8 OTHER FORMS OF ANGINA PECTORIS (HCC): ICD-10-CM

## 2022-08-24 LAB
LV EF: 58 %
LV EF: 70 %
LVEF MODALITY: NORMAL
LVEF MODALITY: NORMAL

## 2022-08-24 PROCEDURE — 78452 HT MUSCLE IMAGE SPECT MULT: CPT

## 2022-08-24 PROCEDURE — 93306 TTE W/DOPPLER COMPLETE: CPT

## 2022-08-24 PROCEDURE — 93017 CV STRESS TEST TRACING ONLY: CPT

## 2022-08-24 PROCEDURE — A9502 TC99M TETROFOSMIN: HCPCS | Performed by: INTERNAL MEDICINE

## 2022-08-24 PROCEDURE — 3430000000 HC RX DIAGNOSTIC RADIOPHARMACEUTICAL: Performed by: INTERNAL MEDICINE

## 2022-08-24 RX ADMIN — TETROFOSMIN 11.1 MILLICURIE: 1.38 INJECTION, POWDER, LYOPHILIZED, FOR SOLUTION INTRAVENOUS at 10:10

## 2022-08-24 RX ADMIN — TETROFOSMIN 34.3 MILLICURIE: 1.38 INJECTION, POWDER, LYOPHILIZED, FOR SOLUTION INTRAVENOUS at 11:20

## 2022-08-24 NOTE — TELEPHONE ENCOUNTER
Attempted to call pt. Automated message stated that call could not be completed at this time. Will try to contact pt at a later time.

## 2022-08-24 NOTE — TELEPHONE ENCOUNTER
----- Message from Lauren Bran MD sent at 8/24/2022  3:12 PM EDT -----  Let patient know their stress test is normal, continue current meds, no new orders or changes at this time. Thanks.

## 2022-08-24 NOTE — TELEPHONE ENCOUNTER
Attempted to call pt. Automated message stated that call could not be completed at this time. Will try to call pt at another time.

## 2022-08-24 NOTE — PROGRESS NOTES
Pt completed stress portion of cardiac stress test. THR reached. Pt is discharged to nuclear department for final scan. Nuclear tech will remove PIV. Discharge instructions given to pt. Pt verbalizes understanding to discharge instructions.

## 2022-08-24 NOTE — TELEPHONE ENCOUNTER
----- Message from Willie Rojas MD sent at 8/24/2022  3:12 PM EDT -----  Let patient know echo test shows normal heart function, no new orders or changes at this time. Thanks.

## 2022-08-25 ENCOUNTER — TELEPHONE (OUTPATIENT)
Dept: CARDIOLOGY CLINIC | Age: 67
End: 2022-08-25

## 2022-08-25 NOTE — TELEPHONE ENCOUNTER
----- Message from Claire Ivey MD sent at 8/24/2022  3:12 PM EDT -----  Let patient know their stress test is normal, continue current meds, no new orders or changes at this time. Thanks. Let patient know echo test shows normal heart function, no new orders or changes at this time.

## 2022-09-06 DIAGNOSIS — R42 DIZZINESS: ICD-10-CM

## 2022-09-08 ENCOUNTER — TELEPHONE (OUTPATIENT)
Dept: CARDIOLOGY CLINIC | Age: 67
End: 2022-09-08

## 2022-09-08 NOTE — TELEPHONE ENCOUNTER
Created telephone encounter. Spoke with Claire Eli relayed message per [facility] regarding monitor results. Pt verbalized understanding.

## 2022-09-08 NOTE — TELEPHONE ENCOUNTER
----- Message from Sanjuan Fleischer, MD sent at 9/6/2022  3:54 PM EDT -----  Let patient know their event monitor test is overall normal, NSR, w/ occ pac/pvc, continue current meds, no new orders or changes at this time. Thanks.

## 2022-10-31 ENCOUNTER — OFFICE VISIT (OUTPATIENT)
Dept: CARDIOLOGY CLINIC | Age: 67
End: 2022-10-31
Payer: MEDICARE

## 2022-10-31 VITALS
HEIGHT: 67 IN | WEIGHT: 135 LBS | OXYGEN SATURATION: 95 % | SYSTOLIC BLOOD PRESSURE: 90 MMHG | BODY MASS INDEX: 21.19 KG/M2 | DIASTOLIC BLOOD PRESSURE: 58 MMHG | HEART RATE: 97 BPM

## 2022-10-31 DIAGNOSIS — I10 PRIMARY HYPERTENSION: ICD-10-CM

## 2022-10-31 DIAGNOSIS — E78.00 HIGH CHOLESTEROL: ICD-10-CM

## 2022-10-31 DIAGNOSIS — I25.10 CORONARY ARTERY DISEASE INVOLVING NATIVE CORONARY ARTERY OF NATIVE HEART WITHOUT ANGINA PECTORIS: Primary | ICD-10-CM

## 2022-10-31 PROCEDURE — 99214 OFFICE O/P EST MOD 30 MIN: CPT | Performed by: NURSE PRACTITIONER

## 2022-10-31 PROCEDURE — 3078F DIAST BP <80 MM HG: CPT | Performed by: NURSE PRACTITIONER

## 2022-10-31 PROCEDURE — 1123F ACP DISCUSS/DSCN MKR DOCD: CPT | Performed by: NURSE PRACTITIONER

## 2022-10-31 PROCEDURE — 3074F SYST BP LT 130 MM HG: CPT | Performed by: NURSE PRACTITIONER

## 2022-10-31 RX ORDER — MULTIVIT-MIN/FA/LYCOPEN/LUTEIN .4-300-25
1 TABLET ORAL DAILY
COMMUNITY

## 2022-10-31 RX ORDER — ASPIRIN 81 MG/1
81 TABLET ORAL DAILY
Qty: 30 TABLET | Refills: 11 | Status: SHIPPED | OUTPATIENT
Start: 2022-10-31

## 2022-10-31 RX ORDER — ACETAMINOPHEN 325 MG/1
975 TABLET ORAL EVERY 8 HOURS PRN
COMMUNITY
Start: 2022-05-20

## 2022-10-31 NOTE — PROGRESS NOTES
[address]   Cardiac Evaluation    Primary Care Doctor:  DRU Sevilla NP    Chief Complaint   Patient presents with    3 Month Follow-Up    Medication Adjustment     Taking 25mg instead of 50mg Hyzaar / Lisinopril-hydrochlorothiazide. Pt state medication causing cough, chills, dizziness, and more intense back ache. Assessment:    1. Coronary artery disease involving native coronary artery of native heart without angina pectoris    2. Primary hypertension    3. High cholesterol        Plan:   Stop the losartan hydrochlorothiazide completely  Restart coated aspirin 81 mg daily  Stay on the atorvastatin (Lipitor) 40 mg daily  Call the office in 2-3 weeks with an update on blood pressure, heart rate and cough  Follow up with me in 3 months    Vitals:    10/31/22 1310 10/31/22 1317   BP: (!) 92/56 (!) 90/58   Site: Right Upper Arm    Pulse: 97    SpO2: 95%    Weight: 135 lb (61.2 kg)    Height: 5' 7\" (1.702 m)        Primary Cardiologist: Dr. Dominic Winston     History of Present Illness:   I had the pleasure of seeing Waqas Dalal (74 y.o.) in follow up for moderate nonobstructive CAD, hypertension, hyperlipidemia as well as RA and smoker. She was seen in July and reported symptoms of shortness of breath and also reported an episode of syncope. She underwent further testing including echocardiogram (normal), stress test (normal), 2-week event monitor (NSR, occasional PACs and PVCs). The Hyzaar was changed to 50/12.5 mg daily (dose decrease) for hypotension. Blood tests reviewed and stable, LDL at goal.     She states the BP medicine makes her cough, causes chills and back pain. She cut this in half. She is not taking the aspirin due to irritating her stomach. The cough is a dry nagging cough. Random throughout the day.   + lightheadedness - sometimes when gets up from lying or sitting position to standing. Her weight is down another 3 lbs here.   States has lost about 20 lbs from May to now. She had previously been on atenolol but stopped due to dizziness. She checks BP at home and runs 110's/ 60-70's. Pulse Ox 93%. HR . Jose Antonio Funk describes symptoms including fatigue, cough, lightheadedness but denies chest pain, palpitations, orthopnea, PND, early saiety, edema, syncope. NYHA:   II  ACC/ AHA Stage:    C    Past Medical History:   has a past medical history of Chest pressure, Depression, High cholesterol, History of facial surgery, Hypertension, and RA (rheumatoid arthritis) ([facility] [address] Surgical History:   has a past surgical history that includes Tubal ligation; Colonoscopy; other surgical history (10/21/2015); Cardiac catheterization (03/25/2019); and Diagnostic Cardiac Cath Lab Procedure. Social History:   reports that she has been smoking cigarettes. She has a 25.00 pack-year smoking history. She has never used smokeless tobacco. She reports that she does not currently use alcohol. She reports that she does not currently use drugs after having used the following drugs: Marijuana Anahi Cotton). Family History:   Family History   Problem Relation Age of Onset    Heart Attack Maternal Uncle     Stroke Maternal Grandmother        Home Medications:  Prior to Admission medications    Medication Sig Start Date End Date Taking? Authorizing Provider   acetaminophen (TYLENOL) 325 MG tablet Take 975 mg by mouth every 8 hours as needed 5/20/22  Yes Historical Provider, MD   vitamin D (CHOLECALCIFEROL) 25 MCG (1000 UT) TABS tablet Take 1,000 Units by mouth daily   Yes Historical Provider, MD   losartan-hydroCHLOROthiazide (HYZAAR) 50-12.5 MG per tablet Take 1 tablet by mouth in the morning.   Patient taking differently: Take 1 tablet by mouth daily 25mg - half tablet a day 7/29/22  Yes Bonny Sutton MD   atorvastatin (LIPITOR) 40 MG tablet TAKE 1 TABLET DAILY 12/3/21  Yes Bonny Sutton MD   escitalopram (LEXAPRO) 20 MG tablet Take 20 mg by mouth daily   Yes Historical Provider, MD   nitroGLYCERIN (NITROSTAT) 0.4 MG SL tablet up to max of 3 total doses. If no relief after 1 dose, call 911. 2/12/19  Yes MD SYLVIA GoodL SURECLICK 50 MG/ML SOAJ  57/5/31  Yes Historical Provider, MD   aspirin 81 MG chewable tablet Take 81 mg by mouth daily   Patient not taking: Reported on 10/31/2022    Historical Provider, MD   citalopram (CELEXA) 20 MG tablet Take 20 mg by mouth daily. Patient not taking: No sig reported    Historical Provider, MD        Allergies:  Plavix [clopidogrel] and Methotrexate     Physical Examination:    Vitals:    10/31/22 1310 10/31/22 1317   BP: (!) 92/56 (!) 90/58   Site: Right Upper Arm    Pulse: 97    SpO2: 95%    Weight: 135 lb (61.2 kg)    Height: 5' 7\" (1.702 m)      Constitutional and General Appearance: Warm and dry, no apparent distress, normal coloration  HEENT:  Normocephalic, atraumatic  Respiratory:  Normal excursion and expansion without use of accessory muscles  Resp Auscultation: Clear to auscultation though scattered wheezes  Cardiovascular: The apical impulses not displaced  Heart tones are crisp and normal  JVP 8 cm H2O  Regular rate and rhythm, normal S1S2, no m/g/r  Peripheral pulses are symmetrical and full  There is no clubbing, cyanosis of the extremities.   No BLE edema  Pedal Pulses: 2+ and equal   Abdomen:  No masses or tenderness  Liver/Spleen: No Abnormalities Noted  Neurological/Psychiatric:  Alert and oriented in all spheres  Moves all extremities well  Exhibits normal gait balance and coordination  No abnormalities of mood, affect, memory, mentation, or behavior are noted    Lab Data:  Most recent lab results below reviewed in office    CBC:   Lab Results   Component Value Date/Time    WBC 4.4 08/11/2022 10:12 AM    WBC 3.2 05/13/2022 11:25 AM    WBC 6.1 07/16/2020 12:00 AM    RBC 4.50 08/11/2022 10:12 AM    RBC 4.79 05/13/2022 11:25 AM    RBC 4.39 03/25/2019 07:39 AM    HGB 13.6 08/11/2022 10:12 AM    HGB 14.3 05/13/2022 11:25 AM HGB 13.9 07/16/2020 12:00 AM    HCT 39.7 08/11/2022 10:12 AM    HCT 42.2 05/13/2022 11:25 AM    HCT 40.8 07/16/2020 12:00 AM    MCV 88.1 08/11/2022 10:12 AM    MCV 88.1 05/13/2022 11:25 AM    MCV 89.3 03/25/2019 07:39 AM    RDW 13.1 08/11/2022 10:12 AM    RDW 13.5 05/13/2022 11:25 AM    RDW 13.2 03/25/2019 07:39 AM     08/11/2022 10:12 AM     05/13/2022 11:25 AM     07/16/2020 12:00 AM     BMP:  Lab Results   Component Value Date/Time     08/11/2022 10:12 AM     05/13/2022 11:25 AM     07/16/2020 12:00 AM    K 3.9 08/11/2022 10:12 AM    K 3.6 05/13/2022 11:25 AM    K 4.0 07/16/2020 12:00 AM    K 3.6 03/25/2019 07:39 AM     08/11/2022 10:12 AM     05/13/2022 11:25 AM    CL 99 07/16/2020 12:00 AM    CO2 29 08/11/2022 10:12 AM    CO2 26 05/13/2022 11:25 AM    CO2 25 07/16/2020 12:00 AM    BUN 10 08/11/2022 10:12 AM    BUN 29 05/13/2022 11:25 AM    BUN 12 07/16/2020 12:00 AM    CREATININE 0.7 08/11/2022 10:12 AM    CREATININE 1.8 05/13/2022 11:25 AM    CREATININE 0.93 07/16/2020 12:00 AM     BNP: No results found for: PROBNP  LIPID:   Lab Results   Component Value Date/Time    TRIG 94 11/23/2021 10:28 AM    TRIG 183 07/16/2020 12:00 AM    HDL 41 08/11/2022 10:12 AM    HDL 45 11/23/2021 10:28 AM    LDLCALC 68 08/11/2022 10:12 AM    LDLCALC 73 11/23/2021 10:28 AM       Cardiac Imaging:  STRESS TEST 8/24/2022:    Summary  There is normal isotope uptake at stress and rest. There is no evidence of  myocardial ischemia or scar. Normal wall motion and myocardial thickening. Post-stress LVEF is >70%. Abnormal stress ECG (abnormal Baseline) without  exercise-induced angina. Angelo score 0 - moderate risk. Overall findings represent a low risk perfusion scan. Recommendation  Consider further cardiac evaluation. ECHO 8/24/2022:    Summary   Left ventricular systolic function is normal with ejection fraction estimated at 55-60 %.    No regional wall motion abnormalities are noted. Left ventricular size is decreased. There is mild concentric left ventricular hypertrophy. Normal left ventricular diastolic filling pressure. Unable to obtain SPAP due to lack of tricuspid regurgitation. CARDIAC EVENT MONITOR AUGUST 2022:    Coronary angiogram 3/25/2019:  Class 3 angina  Low to intermediate risk abnl stress test  Left heart catheterization  LVgram  Coronary angiogam  Coronary cath  PROCEDURE DESCRIPTION   Risks/benefits/alternatives/outcomes were discussed with patient and/or family and informed consent was obtained. Using the [facility] test, the patient's right radial artery was found to be acceptable for cannulation. Patient was prepped draped in the usual sterile fashion. Local anaesthetic was applied over puncture site. Using a back wall technique, a 6 [place] Terumo sheath was inserted into right radial artery. Verapamil, nitroglycerin were administered through the sheath. Heparin was administered. Diagnostic 5fr pigtail, TIG catheters were used for diagnostic angiograms. At the conclusion of the procedure, a TR band was placed over the puncture site and hemostasis was obtained. There were no immediate complications. I supervised sedation with versed 2mg/fentanyl 25mcg during the procedure. 70cc contrast was utilized. <20cc EBL. LVEDP 13   GRADIENT ACROSS AORTIC VALVE No gradient   LV FUNCTION EF 65-75%   WALL MOTION Hyperdynamic   MITRAL REGURGITATION Mild      LM <10% stenosis. LAD Prox 30% stenosis. Mid 30-40% stenosis. Distal 10% stenosis. LAD wraps around apex. D1 is large vessel with ostial 30-40% stenosis. LCX Small vessel. OM1 is small vessel. OM2 is largest OM and has prox-mid 75% stenosis. RCA Dominant. Prox vessel ulcerated plaque noted with prox mid 30-40% stenosis. Mid-distal 50% stenosis. Ulcerated plaque was followed on serial angio in cath lab due to concern may have been raised by catheter.   There was no limitation in flow, staining, steady ekg changes (other than w/ contrast), and no cp during case. Cad/ashd, mainly in LCx and RCA, will treat medially  Patient was intolerant of metoprolol, will add atenolol in its place and add plavix  Ulcerated plaque in RCA, treat with dapt, aspirin and plavix      Stress test 2/11/2019:  [address] to poor exercise capacity 4.6 METs  Abnormal ECG portion of stress test with 1 mm ST depression consistent with  ischemia. Nuclear images are normal with normal LVEF, normal wall motion, and no  reversible defects. Overall, this is a low to intermediate risk study due to abnormal ekgs. Echo 2/15/3400:  LV systolic function is normal with EF estimated at 55-60 %. No regional wall motion abnormalities are noted. There is mild concentric left ventricular hypertrophy. Normal diastolic function with normal filling pressure. Aortic valve appears mildly sclerotic but opens adequately. Inadequate tricuspid regurgitation to estimate systolic pulmonary artery pressure.           I appreciate the opportunity of cooperating in the care of this individual.    Willy Canseco, DRU - CNP, 10/31/2022, 1:22 PM

## 2022-10-31 NOTE — PATIENT INSTRUCTIONS
Stop the losartan hydrochlorothiazide completely  Restart coated aspirin 81 mg daily  Stay on the atorvastatin (Lipitor) 40 mg daily  Call the office in 2-3 weeks with an update on blood pressure, heart rate and cough  Follow up with me in 3 months

## 2022-11-14 ENCOUNTER — TELEPHONE (OUTPATIENT)
Dept: CARDIOLOGY CLINIC | Age: 67
End: 2022-11-14

## 2022-11-14 NOTE — TELEPHONE ENCOUNTER
Pt sts she is doing well even though she is not on B/P medication. Sts B/P runs about 117/78 and she checks this BID.  TY

## 2022-11-25 RX ORDER — ATORVASTATIN CALCIUM 40 MG/1
TABLET, FILM COATED ORAL
Qty: 90 TABLET | Refills: 3 | Status: SHIPPED | OUTPATIENT
Start: 2022-11-25

## 2023-02-03 ENCOUNTER — OFFICE VISIT (OUTPATIENT)
Dept: CARDIOLOGY CLINIC | Age: 68
End: 2023-02-03
Payer: MEDICARE

## 2023-02-03 VITALS
SYSTOLIC BLOOD PRESSURE: 110 MMHG | HEIGHT: 67 IN | BODY MASS INDEX: 21.5 KG/M2 | OXYGEN SATURATION: 96 % | DIASTOLIC BLOOD PRESSURE: 72 MMHG | WEIGHT: 137 LBS | HEART RATE: 88 BPM

## 2023-02-03 DIAGNOSIS — I10 PRIMARY HYPERTENSION: ICD-10-CM

## 2023-02-03 DIAGNOSIS — I25.10 CORONARY ARTERY DISEASE INVOLVING NATIVE CORONARY ARTERY OF NATIVE HEART WITHOUT ANGINA PECTORIS: Primary | ICD-10-CM

## 2023-02-03 DIAGNOSIS — M05.9 RHEUMATOID ARTHRITIS WITH POSITIVE RHEUMATOID FACTOR, INVOLVING UNSPECIFIED SITE (HCC): ICD-10-CM

## 2023-02-03 DIAGNOSIS — E78.00 HIGH CHOLESTEROL: ICD-10-CM

## 2023-02-03 PROCEDURE — 1123F ACP DISCUSS/DSCN MKR DOCD: CPT | Performed by: NURSE PRACTITIONER

## 2023-02-03 PROCEDURE — 3078F DIAST BP <80 MM HG: CPT | Performed by: NURSE PRACTITIONER

## 2023-02-03 PROCEDURE — 3074F SYST BP LT 130 MM HG: CPT | Performed by: NURSE PRACTITIONER

## 2023-02-03 PROCEDURE — 99214 OFFICE O/P EST MOD 30 MIN: CPT | Performed by: NURSE PRACTITIONER

## 2023-02-03 RX ORDER — ASPIRIN 81 MG/1
81 TABLET ORAL DAILY
Qty: 90 TABLET | Refills: 3 | Status: SHIPPED | OUTPATIENT
Start: 2023-02-03

## 2023-02-03 RX ORDER — NITROGLYCERIN 0.4 MG/1
TABLET SUBLINGUAL
Qty: 25 TABLET | Refills: 3 | Status: SHIPPED | OUTPATIENT
Start: 2023-02-03

## 2023-02-03 RX ORDER — ATORVASTATIN CALCIUM 40 MG/1
TABLET, FILM COATED ORAL
Qty: 90 TABLET | Refills: 3 | Status: SHIPPED | OUTPATIENT
Start: 2023-02-03

## 2023-02-03 NOTE — PROGRESS NOTES
[facility]   Cardiac Evaluation    Primary Care Doctor:  DRU Lucero NP    Chief Complaint   Patient presents with    3 Month Follow-Up    Hypertension    Coronary Artery Disease    Cholesterol Problem        Assessment:    1. Coronary artery disease involving native coronary artery of native heart without angina pectoris    2. Primary hypertension    3. High cholesterol    4. Rheumatoid arthritis with positive rheumatoid factor, involving unspecified site [facility])        Plan:   Check blood pressure 2-3 times per week, same time of day  Call if consistently > 140/ > 90 (more than 3 readings in a row)  No change in current heart medicines - aspirin and atorvastatin  No need for testing at this time  Follow up with me or Dr. Colin Snellen in 6 months     Vitals:    02/03/23 1120   BP: 110/72   Site: Right Upper Arm   Position: Sitting   Cuff Size: Medium Adult   Pulse: 88   SpO2: 96%   Weight: 137 lb (62.1 kg)   Height: 5' 7\" (1.702 m)       Primary Cardiologist: Dr. Ines Brizuela     History of Present Illness:   I had the pleasure of seeing Olivier Kearns (74 y.o.) in follow up for moderate nonobstructive CAD, hypertension, hyperlipidemia as well as RA and smoker. At last visit we stopped the losartan hydrochlorothiazide completely due to hypotension, lightheadedness, dry cough and back pain. She was also asked to restart a coated aspirin 81 mg daily for CAD. She called a couple weeks later with improvement in symptoms and a stable blood pressure 110/70's. She is doing fair. Her BP usually runs 110/70's but has had couple of readings of 140/ 90. This is not weekly, not consistent. 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 Not a lot physical activity at work. At  home she stays active walking, house work, taking care of dogs ( walks when better weather). Sleep is good. Appetite is good.    Still with a cough since COVID in May 2022. Also feels cough is due to Enbrel. Stays cold. Doing okay with the coated aspirin but still causes some stomach irritation. Wilhemena Messing describes symptoms including fatigue, cough but denies chest pain, dyspnea, palpitations, orthopnea, PND, early saiety, edema, syncope. NYHA:   II  ACC/ AHA Stage:    C    Past Medical History:   has a past medical history of Chest pressure, Depression, High cholesterol, History of facial surgery, Hypertension, and RA (rheumatoid arthritis) ([facility] [address] Surgical History:   has a past surgical history that includes Tubal ligation; Colonoscopy; other surgical history (10/21/2015); Cardiac catheterization (03/25/2019); and Diagnostic Cardiac Cath Lab Procedure. Social History:   reports that she has been smoking cigarettes. She has a 18.75 pack-year smoking history. She has never used smokeless tobacco. She reports that she does not currently use alcohol. She reports that she does not currently use drugs after having used the following drugs: Marijuana Laveda [place]). Family History:   Family History   Problem Relation Age of Onset    Heart Attack Maternal Uncle     Stroke Maternal Grandmother        Home Medications:  Prior to Admission medications    Medication Sig Start Date End Date Taking?  Authorizing Provider   Ferrous Sulfate (IRON PO) Take by mouth   Yes Historical Provider, MD   Biotin w/ Vitamins C & E (HAIR/SKIN/NAILS PO) Take by mouth   Yes Historical Provider, MD   atorvastatin (LIPITOR) 40 MG tablet TAKE 1 TABLET DAILY 11/25/22  Yes Nilda Holly MD   vitamin D (CHOLECALCIFEROL) 25 MCG (1000 UT) TABS tablet Take 1,000 Units by mouth daily   Yes Historical Provider, MD   Ascorbic Acid 125 MG CHEW Take by mouth   Yes Historical Provider, MD   B Complex Vitamins (VITAMIN B COMPLEX PO) Take by mouth   Yes Historical Provider, MD   Multiple Vitamins-Minerals (CENTRUM SILVER ADULT 50+) TABS Take 1 tablet by mouth daily   Yes Historical Provider, MD   aspirin EC 81 MG EC tablet Take 1 tablet by mouth daily 10/31/22  Yes María Rodriguez APRN - CNP   escitalopram (LEXAPRO) 20 MG tablet Take 20 mg by mouth daily   Yes Historical Provider, MD   nitroGLYCERIN (NITROSTAT) 0.4 MG SL tablet up to max of 3 total doses. If no relief after 1 dose, call 911. 2/12/19  Yes Cassius Snider MD   ENBREL SURECLICK 50 MG/ML SOAJ  37/4/65  Yes Historical Provider, MD   acetaminophen (TYLENOL) 325 MG tablet Take 975 mg by mouth every 8 hours as needed  Patient not taking: Reported on 2/3/2023 5/20/22   Historical Provider, MD        Allergies:  Plavix [clopidogrel] and Methotrexate     Physical Examination:    Vitals:    02/03/23 1120   BP: 110/72   Site: Right Upper Arm   Position: Sitting   Cuff Size: Medium Adult   Pulse: 88   SpO2: 96%   Weight: 137 lb (62.1 kg)   Height: 5' 7\" (1.702 m)     Constitutional and General Appearance: Warm and dry, no apparent distress, normal coloration  HEENT:  Normocephalic, atraumatic  Respiratory:  Normal excursion and expansion without use of accessory muscles  Resp Auscultation: Clear to auscultation   Cardiovascular: The apical impulses not displaced  Heart tones are crisp and normal  JVP 8 cm H2O  Regular rate and rhythm, normal S1S2, no m/g/r  Peripheral pulses are symmetrical and full  There is no clubbing, cyanosis of the extremities.   No BLE edema  Pedal Pulses: 2+ and equal   Abdomen:  No masses or tenderness  Liver/Spleen: No Abnormalities Noted  Neurological/Psychiatric:  Alert and oriented in all spheres  Moves all extremities well  Exhibits normal gait balance and coordination  No abnormalities of mood, affect, memory, mentation, or behavior are noted    Lab Data:  Most recent lab results below reviewed in office    CBC:   Lab Results   Component Value Date/Time    WBC 4.4 08/11/2022 10:12 AM    WBC 3.2 05/13/2022 11:25 AM    WBC 6.1 07/16/2020 12:00 AM    RBC 4.50 08/11/2022 10:12 AM    RBC 4.79 05/13/2022 11:25 AM    RBC 4.39 03/25/2019 07:39 AM    HGB 13.6 08/11/2022 10:12 AM    HGB 14.3 05/13/2022 11:25 AM    HGB 13.9 07/16/2020 12:00 AM    HCT 39.7 08/11/2022 10:12 AM    HCT 42.2 05/13/2022 11:25 AM    HCT 40.8 07/16/2020 12:00 AM    MCV 88.1 08/11/2022 10:12 AM    MCV 88.1 05/13/2022 11:25 AM    MCV 89.3 03/25/2019 07:39 AM    RDW 13.1 08/11/2022 10:12 AM    RDW 13.5 05/13/2022 11:25 AM    RDW 13.2 03/25/2019 07:39 AM     08/11/2022 10:12 AM     05/13/2022 11:25 AM     07/16/2020 12:00 AM     BMP:  Lab Results   Component Value Date/Time     08/11/2022 10:12 AM     05/13/2022 11:25 AM     07/16/2020 12:00 AM    K 3.9 08/11/2022 10:12 AM    K 3.6 05/13/2022 11:25 AM    K 4.0 07/16/2020 12:00 AM    K 3.6 03/25/2019 07:39 AM     08/11/2022 10:12 AM     05/13/2022 11:25 AM    CL 99 07/16/2020 12:00 AM    CO2 29 08/11/2022 10:12 AM    CO2 26 05/13/2022 11:25 AM    CO2 25 07/16/2020 12:00 AM    BUN 10 08/11/2022 10:12 AM    BUN 29 05/13/2022 11:25 AM    BUN 12 07/16/2020 12:00 AM    CREATININE 0.7 08/11/2022 10:12 AM    CREATININE 1.8 05/13/2022 11:25 AM    CREATININE 0.93 07/16/2020 12:00 AM     BNP: No results found for: PROBNP  LIPID:   Lab Results   Component Value Date/Time    TRIG 94 11/23/2021 10:28 AM    TRIG 183 07/16/2020 12:00 AM    HDL 41 08/11/2022 10:12 AM    HDL 45 11/23/2021 10:28 AM    LDLCALC 68 08/11/2022 10:12 AM    LDLCALC 73 11/23/2021 10:28 AM       Cardiac Imaging:  STRESS TEST 8/24/2022:    Summary  There is normal isotope uptake at stress and rest. There is no evidence of  myocardial ischemia or scar. Normal wall motion and myocardial thickening. Post-stress LVEF is >70%. Abnormal stress ECG (abnormal Baseline) without  exercise-induced angina. Angelo score 0 - moderate risk. Overall findings represent a low risk perfusion scan. Recommendation  Consider further cardiac evaluation.        ECHO 8/24/2022:    Summary   Left ventricular systolic function is normal with ejection fraction estimated at 55-60 %. No regional wall motion abnormalities are noted. Left ventricular size is decreased. There is mild concentric left ventricular hypertrophy. Normal left ventricular diastolic filling pressure. Unable to obtain SPAP due to lack of tricuspid regurgitation. Coronary angiogram 3/25/2019:  Class 3 angina  Low to intermediate risk abnl stress test  Left heart catheterization  LVgram  Coronary angiogam  Coronary cath  PROCEDURE DESCRIPTION   Risks/benefits/alternatives/outcomes were discussed with patient and/or family and informed consent was obtained. Using the [facility] test, the patient's right radial artery was found to be acceptable for cannulation. Patient was prepped draped in the usual sterile fashion. Local anaesthetic was applied over puncture site. Using a back wall technique, a 6 [place] Terumo sheath was inserted into right radial artery. Verapamil, nitroglycerin were administered through the sheath. Heparin was administered. Diagnostic 5fr pigtail, TIG catheters were used for diagnostic angiograms. At the conclusion of the procedure, a TR band was placed over the puncture site and hemostasis was obtained. There were no immediate complications. I supervised sedation with versed 2mg/fentanyl 25mcg during the procedure. 70cc contrast was utilized. <20cc EBL. LVEDP 13   GRADIENT ACROSS AORTIC VALVE No gradient   LV FUNCTION EF 65-75%   WALL MOTION Hyperdynamic   MITRAL REGURGITATION Mild      LM <10% stenosis. LAD Prox 30% stenosis. Mid 30-40% stenosis. Distal 10% stenosis. LAD wraps around apex. D1 is large vessel with ostial 30-40% stenosis. LCX Small vessel. OM1 is small vessel. OM2 is largest OM and has prox-mid 75% stenosis. RCA Dominant. Prox vessel ulcerated plaque noted with prox mid 30-40% stenosis. Mid-distal 50% stenosis.   Ulcerated plaque was followed on serial angio in cath lab due to concern may have been raised by catheter. There was no limitation in flow, staining, steady ekg changes (other than w/ contrast), and no cp during case. Cad/ashd, mainly in LCx and RCA, will treat medially  Patient was intolerant of metoprolol, will add atenolol in its place and add plavix  Ulcerated plaque in RCA, treat with dapt, aspirin and plavix      Stress test 2/11/2019:  Vola Passy to poor exercise capacity 4.6 METs  Abnormal ECG portion of stress test with 1 mm ST depression consistent with  ischemia. Nuclear images are normal with normal LVEF, normal wall motion, and no  reversible defects. Overall, this is a low to intermediate risk study due to abnormal ekgs. Echo 1/96/4875:  LV systolic function is normal with EF estimated at 55-60 %. No regional wall motion abnormalities are noted. There is mild concentric left ventricular hypertrophy. Normal diastolic function with normal filling pressure. Aortic valve appears mildly sclerotic but opens adequately. Inadequate tricuspid regurgitation to estimate systolic pulmonary artery   pressure.           I appreciate the opportunity of cooperating in the care of this individual.    DRU Balbuena CNP, 2/3/2023, 11:52 AM

## 2023-02-03 NOTE — PATIENT INSTRUCTIONS
Check blood pressure 2-3 times per week, same time of day  Call if consistently > 140/ > 90 (more than 3 readings in a row)  No change in current heart medicines = aspirin and atorvastatin  No need for testing at this time  Follow up with me or Dr. Murray in 6 months

## 2023-07-18 NOTE — TELEPHONE ENCOUNTER
----- Message from Ana Santiago MD sent at 8/11/2022 10:50 AM EDT -----  Let patient know their CBC test is normal, continue current meds, no new orders or changes at this time. Thanks. [facility]

## 2023-08-18 ENCOUNTER — OFFICE VISIT (OUTPATIENT)
Dept: CARDIOLOGY CLINIC | Age: 68
End: 2023-08-18
Payer: MEDICARE

## 2023-08-18 VITALS
OXYGEN SATURATION: 92 % | HEIGHT: 67 IN | BODY MASS INDEX: 21.82 KG/M2 | SYSTOLIC BLOOD PRESSURE: 135 MMHG | DIASTOLIC BLOOD PRESSURE: 75 MMHG | WEIGHT: 139 LBS | HEART RATE: 87 BPM

## 2023-08-18 DIAGNOSIS — I25.10 CORONARY ARTERY DISEASE INVOLVING NATIVE CORONARY ARTERY OF NATIVE HEART WITHOUT ANGINA PECTORIS: Primary | ICD-10-CM

## 2023-08-18 DIAGNOSIS — E78.00 HIGH CHOLESTEROL: ICD-10-CM

## 2023-08-18 DIAGNOSIS — I10 PRIMARY HYPERTENSION: ICD-10-CM

## 2023-08-18 DIAGNOSIS — F17.200 SMOKER: ICD-10-CM

## 2023-08-18 DIAGNOSIS — M05.9 RHEUMATOID ARTHRITIS WITH POSITIVE RHEUMATOID FACTOR, INVOLVING UNSPECIFIED SITE (HCC): ICD-10-CM

## 2023-08-18 PROCEDURE — 3078F DIAST BP <80 MM HG: CPT | Performed by: NURSE PRACTITIONER

## 2023-08-18 PROCEDURE — 3017F COLORECTAL CA SCREEN DOC REV: CPT | Performed by: NURSE PRACTITIONER

## 2023-08-18 PROCEDURE — G8400 PT W/DXA NO RESULTS DOC: HCPCS | Performed by: NURSE PRACTITIONER

## 2023-08-18 PROCEDURE — G8420 CALC BMI NORM PARAMETERS: HCPCS | Performed by: NURSE PRACTITIONER

## 2023-08-18 PROCEDURE — 3075F SYST BP GE 130 - 139MM HG: CPT | Performed by: NURSE PRACTITIONER

## 2023-08-18 PROCEDURE — 1123F ACP DISCUSS/DSCN MKR DOCD: CPT | Performed by: NURSE PRACTITIONER

## 2023-08-18 PROCEDURE — 99213 OFFICE O/P EST LOW 20 MIN: CPT | Performed by: NURSE PRACTITIONER

## 2023-08-18 PROCEDURE — 4004F PT TOBACCO SCREEN RCVD TLK: CPT | Performed by: NURSE PRACTITIONER

## 2023-08-18 PROCEDURE — 1090F PRES/ABSN URINE INCON ASSESS: CPT | Performed by: NURSE PRACTITIONER

## 2023-08-18 PROCEDURE — G8427 DOCREV CUR MEDS BY ELIG CLIN: HCPCS | Performed by: NURSE PRACTITIONER

## 2023-08-18 NOTE — PATIENT INSTRUCTIONS
No change in medicines  Consider increasing activity, recommend 150 minutes per week of cardiovascular activity  Have fasting blood work with next PCP visit  Follow up with me or Dr. Nikolas Liu in 1 year

## 2023-08-18 NOTE — PROGRESS NOTES
[facility]   Cardiac Evaluation    Primary Care Doctor:  DRU Beckman NP    Chief Complaint   Patient presents with    6 Month Follow-Up    Hypertension    Coronary Artery Disease        Assessment:    1. Coronary artery disease involving native coronary artery of native heart without angina pectoris    2. Primary hypertension    3. High cholesterol    4. Rheumatoid arthritis with positive rheumatoid factor, involving unspecified site ([address])    5. Smoker        Plan:   No change in medicines  Consider increasing activity, recommend 150 minutes per week of cardiovascular activity  Have fasting blood work with next PCP visit  Follow up with me or Dr. Pepe Vang in 1 year     Vitals:    08/18/23 1003 08/18/23 1013   BP: (!) 140/70 135/75   Site: Right Upper Arm Right Upper Arm   Position: Sitting Sitting   Cuff Size: Medium Adult Medium Adult   Pulse: 87    SpO2: 92%    Weight: 139 lb (63 kg)    Height: 5' 7\" (1.702 m)        Primary Cardiologist: Dr. Nicolasa Shoemaker     History of Present Illness:   I had the pleasure of seeing Allyson Pierson (79 y.o.) in follow up for moderate nonobstructive CAD, hypertension, hyperlipidemia as well as RA and smoker. She is doing well. No illnesses, hospitalizations or surgeries. No change in medicines. She has started taking super greens to supplement diet. BP at home 115 - 130/ 70-80's. RA is doing well on the Enbrel. Been busy doing [employer]. Also still works in warehouse, computer work. Lots of sitting. No dedicated exercise. No chest pain or shortness of breath with yard work, activity. Sleep is good. Appetite is good. Has sinus trouble and allergies are worse this year. Minimal cough. Still smokes < 1 ppd. Allyson Pierson describes symptoms including none but denies chest pain, dyspnea, palpitations, orthopnea, PND, fatigue, early saiety, edema, syncope.      NYHA:   I  ACC/ AHA Stage:    C    Past Medical History:   has a past medical

## 2023-09-09 LAB
ALBUMIN SERPL-MCNC: 4.1 G/DL
ALP BLD-CCNC: 94 U/L
ALT SERPL-CCNC: 16 U/L
ANION GAP SERPL CALCULATED.3IONS-SCNC: NORMAL MMOL/L
AST SERPL-CCNC: 22 U/L
BILIRUB SERPL-MCNC: 0.6 MG/DL (ref 0.1–1.4)
BUN BLDV-MCNC: 17 MG/DL
CALCIUM SERPL-MCNC: 9.3 MG/DL
CHLORIDE BLD-SCNC: 103 MMOL/L
CHOLESTEROL, TOTAL: 152 MG/DL
CHOLESTEROL/HDL RATIO: NORMAL
CO2: 27 MMOL/L
CREAT SERPL-MCNC: 0.85 MG/DL
EGFR: 76
GLUCOSE BLD-MCNC: 89 MG/DL
HDLC SERPL-MCNC: 53 MG/DL (ref 35–70)
LDL CHOLESTEROL CALCULATED: 82 MG/DL (ref 0–160)
NONHDLC SERPL-MCNC: NORMAL MG/DL
POTASSIUM SERPL-SCNC: 4.4 MMOL/L
SODIUM BLD-SCNC: 143 MMOL/L
TOTAL PROTEIN: 7.3
TRIGL SERPL-MCNC: 91 MG/DL
VLDLC SERPL CALC-MCNC: 17 MG/DL

## 2023-09-12 ENCOUNTER — TELEPHONE (OUTPATIENT)
Dept: CARDIOLOGY CLINIC | Age: 68
End: 2023-09-12

## 2023-09-12 NOTE — TELEPHONE ENCOUNTER
----- Message from DRU Restrepo CNP sent at 9/12/2023  2:09 PM EDT -----  Cholesterol overall okay but not as good as last year. Continue current treatment regimen and continue to work on increasing physical activity.    DRU Restrepo CNP